# Patient Record
Sex: FEMALE | Race: OTHER | HISPANIC OR LATINO | Employment: UNEMPLOYED | URBAN - METROPOLITAN AREA
[De-identification: names, ages, dates, MRNs, and addresses within clinical notes are randomized per-mention and may not be internally consistent; named-entity substitution may affect disease eponyms.]

---

## 2021-03-17 LAB
CHOLEST SERPL-MSCNC: 187 MG/DL (ref 0–200)
HBA1C MFR BLD: 12.5 % (ref 4–6)
HDLC SERPL-MCNC: 40 MG/DL
LDLC SERPL CALC-MCNC: 122 MG/DL
MICROALB/CREAT RATIO: 14
TRIGLYCERIDE (LIPID PAN): 140
VLDLC SERPL-MCNC: 25 MG/DL

## 2021-04-22 ENCOUNTER — OFFICE VISIT (OUTPATIENT)
Dept: PRIMARY CARE CLINIC | Facility: CLINIC | Age: 56
End: 2021-04-22
Payer: MEDICAID

## 2021-04-22 VITALS
RESPIRATION RATE: 18 BRPM | HEART RATE: 81 BPM | HEIGHT: 63 IN | WEIGHT: 137.38 LBS | BODY MASS INDEX: 24.34 KG/M2 | SYSTOLIC BLOOD PRESSURE: 108 MMHG | DIASTOLIC BLOOD PRESSURE: 62 MMHG | OXYGEN SATURATION: 98 %

## 2021-04-22 DIAGNOSIS — E55.9 VITAMIN D DEFICIENCY: Primary | ICD-10-CM

## 2021-04-22 DIAGNOSIS — E53.8 VITAMIN B12 DEFICIENCY: ICD-10-CM

## 2021-04-22 DIAGNOSIS — Z12.11 COLON CANCER SCREENING: ICD-10-CM

## 2021-04-22 DIAGNOSIS — Z12.31 BREAST CANCER SCREENING BY MAMMOGRAM: ICD-10-CM

## 2021-04-22 DIAGNOSIS — Z00.00 ANNUAL PHYSICAL EXAM: ICD-10-CM

## 2021-04-22 DIAGNOSIS — E03.8 OTHER SPECIFIED HYPOTHYROIDISM: ICD-10-CM

## 2021-04-22 DIAGNOSIS — R35.0 URINE FREQUENCY: ICD-10-CM

## 2021-04-22 DIAGNOSIS — E11.9 TYPE 2 DIABETES MELLITUS WITHOUT COMPLICATION, WITHOUT LONG-TERM CURRENT USE OF INSULIN: Chronic | ICD-10-CM

## 2021-04-22 PROCEDURE — 99204 OFFICE O/P NEW MOD 45 MIN: CPT | Mod: S$GLB,,, | Performed by: NURSE PRACTITIONER

## 2021-04-22 PROCEDURE — 99204 PR OFFICE/OUTPT VISIT, NEW, LEVL IV, 45-59 MIN: ICD-10-PCS | Mod: S$GLB,,, | Performed by: NURSE PRACTITIONER

## 2021-04-22 RX ORDER — METFORMIN HYDROCHLORIDE 500 MG/1
500 TABLET ORAL
COMMUNITY
End: 2021-04-26

## 2021-04-23 LAB
25(OH)D3 SERPL-MCNC: 27 NG/ML (ref 30–100)
ALBUMIN SERPL-MCNC: 4.4 G/DL (ref 3.6–5.1)
ALBUMIN/GLOB SERPL: 1.7 (CALC) (ref 1–2.5)
ALP SERPL-CCNC: 108 U/L (ref 37–153)
ALT SERPL-CCNC: 10 U/L (ref 6–29)
AST SERPL-CCNC: 14 U/L (ref 10–35)
BASOPHILS # BLD AUTO: 33 CELLS/UL (ref 0–200)
BASOPHILS NFR BLD AUTO: 0.4 %
BILIRUB SERPL-MCNC: 0.4 MG/DL (ref 0.2–1.2)
BUN SERPL-MCNC: 12 MG/DL (ref 7–25)
BUN/CREAT SERPL: 26 (CALC) (ref 6–22)
CALCIUM SERPL-MCNC: 9.3 MG/DL (ref 8.6–10.4)
CHLORIDE SERPL-SCNC: 103 MMOL/L (ref 98–110)
CHOLEST SERPL-MCNC: 221 MG/DL
CHOLEST/HDLC SERPL: 5.1 (CALC)
CO2 SERPL-SCNC: 30 MMOL/L (ref 20–32)
CREAT SERPL-MCNC: 0.47 MG/DL (ref 0.5–1.05)
EOSINOPHIL # BLD AUTO: 90 CELLS/UL (ref 15–500)
EOSINOPHIL NFR BLD AUTO: 1.1 %
ERYTHROCYTE [DISTWIDTH] IN BLOOD BY AUTOMATED COUNT: 13.4 % (ref 11–15)
GLOBULIN SER CALC-MCNC: 2.6 G/DL (CALC) (ref 1.9–3.7)
GLUCOSE SERPL-MCNC: 271 MG/DL (ref 65–139)
HBA1C MFR BLD: 11.2 % OF TOTAL HGB
HCT VFR BLD AUTO: 39.5 % (ref 35–45)
HDLC SERPL-MCNC: 43 MG/DL
HGB BLD-MCNC: 13.2 G/DL (ref 11.7–15.5)
LDLC SERPL CALC-MCNC: 129 MG/DL (CALC)
LYMPHOCYTES # BLD AUTO: 2657 CELLS/UL (ref 850–3900)
LYMPHOCYTES NFR BLD AUTO: 32.4 %
MCH RBC QN AUTO: 28.3 PG (ref 27–33)
MCHC RBC AUTO-ENTMCNC: 33.4 G/DL (ref 32–36)
MCV RBC AUTO: 84.8 FL (ref 80–100)
MONOCYTES # BLD AUTO: 418 CELLS/UL (ref 200–950)
MONOCYTES NFR BLD AUTO: 5.1 %
NEUTROPHILS # BLD AUTO: 5002 CELLS/UL (ref 1500–7800)
NEUTROPHILS NFR BLD AUTO: 61 %
NONHDLC SERPL-MCNC: 178 MG/DL (CALC)
PLATELET # BLD AUTO: 207 THOUSAND/UL (ref 140–400)
PMV BLD REES-ECKER: 13.8 FL (ref 7.5–12.5)
POTASSIUM SERPL-SCNC: 3.8 MMOL/L (ref 3.5–5.3)
PROT SERPL-MCNC: 7 G/DL (ref 6.1–8.1)
RBC # BLD AUTO: 4.66 MILLION/UL (ref 3.8–5.1)
SODIUM SERPL-SCNC: 140 MMOL/L (ref 135–146)
T4 FREE SERPL-MCNC: 1.1 NG/DL (ref 0.8–1.8)
TRIGL SERPL-MCNC: 340 MG/DL
TSH SERPL-ACNC: 0.59 MIU/L
WBC # BLD AUTO: 8.2 THOUSAND/UL (ref 3.8–10.8)

## 2021-04-24 LAB
APPEARANCE UR: CLEAR
BACTERIA #/AREA URNS HPF: ABNORMAL /HPF
BACTERIA UR CULT: ABNORMAL
BILIRUB UR QL STRIP: NEGATIVE
COLOR UR: YELLOW
FOLATE SERPL-MCNC: 20.3 NG/ML
GLUCOSE UR QL STRIP: ABNORMAL
HGB UR QL STRIP: NEGATIVE
HYALINE CASTS #/AREA URNS LPF: ABNORMAL /LPF
KETONES UR QL STRIP: ABNORMAL
LEUKOCYTE ESTERASE UR QL STRIP: ABNORMAL
NITRITE UR QL STRIP: NEGATIVE
PH UR STRIP: 5.5 [PH] (ref 5–8)
PROT UR QL STRIP: NEGATIVE
RBC #/AREA URNS HPF: ABNORMAL /HPF
SP GR UR STRIP: 1.03 (ref 1–1.03)
SQUAMOUS #/AREA URNS HPF: ABNORMAL /HPF
T3FREE SERPL-MCNC: 3 PG/ML (ref 2.3–4.2)
VIT B12 SERPL-MCNC: 388 PG/ML (ref 200–1100)
WBC #/AREA URNS HPF: ABNORMAL /HPF

## 2021-04-26 DIAGNOSIS — E78.49 OTHER HYPERLIPIDEMIA: ICD-10-CM

## 2021-04-26 DIAGNOSIS — E11.9 TYPE 2 DIABETES MELLITUS WITHOUT COMPLICATION, WITHOUT LONG-TERM CURRENT USE OF INSULIN: ICD-10-CM

## 2021-04-26 DIAGNOSIS — N30.00 ACUTE CYSTITIS WITHOUT HEMATURIA: Primary | ICD-10-CM

## 2021-04-26 RX ORDER — ROSUVASTATIN CALCIUM 5 MG/1
5 TABLET, COATED ORAL DAILY
Qty: 90 TABLET | Refills: 3 | Status: SHIPPED | OUTPATIENT
Start: 2021-04-26 | End: 2022-06-16

## 2021-04-26 RX ORDER — METFORMIN HYDROCHLORIDE 500 MG/1
500 TABLET ORAL 2 TIMES DAILY WITH MEALS
Qty: 180 TABLET | Refills: 1 | Status: SHIPPED | OUTPATIENT
Start: 2021-04-26 | End: 2021-10-21 | Stop reason: ALTCHOICE

## 2021-04-26 RX ORDER — AMOXICILLIN 500 MG/1
500 TABLET, FILM COATED ORAL EVERY 12 HOURS
Qty: 14 TABLET | Refills: 0 | Status: SHIPPED | OUTPATIENT
Start: 2021-04-26 | End: 2021-05-03

## 2021-04-27 ENCOUNTER — TELEPHONE (OUTPATIENT)
Dept: PRIMARY CARE CLINIC | Facility: CLINIC | Age: 56
End: 2021-04-27

## 2021-05-05 ENCOUNTER — TELEPHONE (OUTPATIENT)
Dept: SURGERY | Facility: CLINIC | Age: 56
End: 2021-05-05

## 2021-05-05 DIAGNOSIS — Z12.11 COLON CANCER SCREENING: Primary | ICD-10-CM

## 2021-05-06 ENCOUNTER — PATIENT OUTREACH (OUTPATIENT)
Dept: ADMINISTRATIVE | Facility: HOSPITAL | Age: 56
End: 2021-05-06

## 2021-07-22 ENCOUNTER — OFFICE VISIT (OUTPATIENT)
Dept: PRIMARY CARE CLINIC | Facility: CLINIC | Age: 56
End: 2021-07-22
Payer: MEDICAID

## 2021-07-22 VITALS
OXYGEN SATURATION: 98 % | SYSTOLIC BLOOD PRESSURE: 112 MMHG | DIASTOLIC BLOOD PRESSURE: 67 MMHG | BODY MASS INDEX: 24.63 KG/M2 | RESPIRATION RATE: 18 BRPM | HEART RATE: 79 BPM | HEIGHT: 63 IN | WEIGHT: 139 LBS

## 2021-07-22 DIAGNOSIS — Z12.31 BREAST CANCER SCREENING BY MAMMOGRAM: ICD-10-CM

## 2021-07-22 DIAGNOSIS — E78.49 OTHER HYPERLIPIDEMIA: ICD-10-CM

## 2021-07-22 DIAGNOSIS — E11.9 TYPE 2 DIABETES MELLITUS WITHOUT COMPLICATION, WITHOUT LONG-TERM CURRENT USE OF INSULIN: Primary | ICD-10-CM

## 2021-07-22 PROBLEM — E78.00 PURE HYPERCHOLESTEROLEMIA: Status: ACTIVE | Noted: 2021-07-22

## 2021-07-22 PROCEDURE — 99214 OFFICE O/P EST MOD 30 MIN: CPT | Mod: S$GLB,,, | Performed by: NURSE PRACTITIONER

## 2021-07-22 PROCEDURE — 99214 PR OFFICE/OUTPT VISIT, EST, LEVL IV, 30-39 MIN: ICD-10-PCS | Mod: S$GLB,,, | Performed by: NURSE PRACTITIONER

## 2021-07-22 RX ORDER — PIOGLITAZONEHYDROCHLORIDE 15 MG/1
TABLET ORAL
COMMUNITY
Start: 2021-05-06 | End: 2021-07-22 | Stop reason: ALTCHOICE

## 2021-07-29 LAB
ALBUMIN SERPL-MCNC: 4.4 G/DL (ref 3.6–5.1)
ALBUMIN/GLOB SERPL: 1.6 (CALC) (ref 1–2.5)
ALP SERPL-CCNC: 94 U/L (ref 37–153)
ALT SERPL-CCNC: 17 U/L (ref 6–29)
AST SERPL-CCNC: 16 U/L (ref 10–35)
BILIRUB SERPL-MCNC: 0.6 MG/DL (ref 0.2–1.2)
BUN SERPL-MCNC: 12 MG/DL (ref 7–25)
BUN/CREAT SERPL: ABNORMAL (CALC) (ref 6–22)
CALCIUM SERPL-MCNC: 8.9 MG/DL (ref 8.6–10.4)
CHLORIDE SERPL-SCNC: 105 MMOL/L (ref 98–110)
CO2 SERPL-SCNC: 27 MMOL/L (ref 20–32)
CREAT SERPL-MCNC: 0.58 MG/DL (ref 0.5–1.05)
GLOBULIN SER CALC-MCNC: 2.7 G/DL (CALC) (ref 1.9–3.7)
GLUCOSE SERPL-MCNC: 231 MG/DL (ref 65–99)
HBA1C MFR BLD: 11.1 % OF TOTAL HGB
POTASSIUM SERPL-SCNC: 4.1 MMOL/L (ref 3.5–5.3)
PROT SERPL-MCNC: 7.1 G/DL (ref 6.1–8.1)
SODIUM SERPL-SCNC: 140 MMOL/L (ref 135–146)

## 2021-08-02 ENCOUNTER — PATIENT MESSAGE (OUTPATIENT)
Dept: ADMINISTRATIVE | Facility: HOSPITAL | Age: 56
End: 2021-08-02

## 2021-08-05 ENCOUNTER — TELEPHONE (OUTPATIENT)
Dept: PRIMARY CARE CLINIC | Facility: CLINIC | Age: 56
End: 2021-08-05

## 2021-09-03 ENCOUNTER — IMMUNIZATION (OUTPATIENT)
Dept: HEMATOLOGY/ONCOLOGY | Facility: CLINIC | Age: 56
End: 2021-09-03
Payer: MEDICAID

## 2021-09-03 DIAGNOSIS — Z23 NEED FOR VACCINATION: Primary | ICD-10-CM

## 2021-09-03 PROCEDURE — 0001A COVID-19, MRNA, LNP-S, PF, 30 MCG/0.3 ML DOSE VACCINE: ICD-10-PCS | Mod: CV19,S$GLB,, | Performed by: FAMILY MEDICINE

## 2021-09-03 PROCEDURE — 91300 COVID-19, MRNA, LNP-S, PF, 30 MCG/0.3 ML DOSE VACCINE: CPT | Mod: S$GLB,,, | Performed by: FAMILY MEDICINE

## 2021-09-03 PROCEDURE — 0001A COVID-19, MRNA, LNP-S, PF, 30 MCG/0.3 ML DOSE VACCINE: CPT | Mod: CV19,S$GLB,, | Performed by: FAMILY MEDICINE

## 2021-09-03 PROCEDURE — 91300 COVID-19, MRNA, LNP-S, PF, 30 MCG/0.3 ML DOSE VACCINE: ICD-10-PCS | Mod: S$GLB,,, | Performed by: FAMILY MEDICINE

## 2021-09-27 ENCOUNTER — IMMUNIZATION (OUTPATIENT)
Dept: HEMATOLOGY/ONCOLOGY | Facility: CLINIC | Age: 56
End: 2021-09-27
Payer: MEDICAID

## 2021-09-27 DIAGNOSIS — Z23 NEED FOR VACCINATION: Primary | ICD-10-CM

## 2021-09-27 PROCEDURE — 0002A COVID-19, MRNA, LNP-S, PF, 30 MCG/0.3 ML DOSE VACCINE: ICD-10-PCS | Mod: S$GLB,,, | Performed by: FAMILY MEDICINE

## 2021-09-27 PROCEDURE — 91300 COVID-19, MRNA, LNP-S, PF, 30 MCG/0.3 ML DOSE VACCINE: ICD-10-PCS | Mod: S$GLB,,, | Performed by: FAMILY MEDICINE

## 2021-09-27 PROCEDURE — 0002A COVID-19, MRNA, LNP-S, PF, 30 MCG/0.3 ML DOSE VACCINE: CPT | Mod: S$GLB,,, | Performed by: FAMILY MEDICINE

## 2021-09-27 PROCEDURE — 91300 COVID-19, MRNA, LNP-S, PF, 30 MCG/0.3 ML DOSE VACCINE: CPT | Mod: S$GLB,,, | Performed by: FAMILY MEDICINE

## 2021-10-21 ENCOUNTER — OFFICE VISIT (OUTPATIENT)
Dept: PRIMARY CARE CLINIC | Facility: CLINIC | Age: 56
End: 2021-10-21
Payer: MEDICAID

## 2021-10-21 VITALS
SYSTOLIC BLOOD PRESSURE: 111 MMHG | RESPIRATION RATE: 18 BRPM | HEIGHT: 63 IN | OXYGEN SATURATION: 99 % | DIASTOLIC BLOOD PRESSURE: 74 MMHG | HEART RATE: 72 BPM | WEIGHT: 143 LBS | BODY MASS INDEX: 25.34 KG/M2

## 2021-10-21 DIAGNOSIS — E78.00 PURE HYPERCHOLESTEROLEMIA: Primary | ICD-10-CM

## 2021-10-21 DIAGNOSIS — E11.9 TYPE 2 DIABETES MELLITUS WITHOUT COMPLICATION, WITHOUT LONG-TERM CURRENT USE OF INSULIN: ICD-10-CM

## 2021-10-21 DIAGNOSIS — R80.9 URINE TEST POSITIVE FOR MICROALBUMINURIA: ICD-10-CM

## 2021-10-21 PROCEDURE — 99214 PR OFFICE/OUTPT VISIT, EST, LEVL IV, 30-39 MIN: ICD-10-PCS | Mod: S$GLB,,, | Performed by: NURSE PRACTITIONER

## 2021-10-21 PROCEDURE — 99214 OFFICE O/P EST MOD 30 MIN: CPT | Mod: S$GLB,,, | Performed by: NURSE PRACTITIONER

## 2021-11-20 LAB
ALBUMIN SERPL-MCNC: 4.1 G/DL (ref 3.6–5.1)
ALBUMIN/GLOB SERPL: 1.5 (CALC) (ref 1–2.5)
ALP SERPL-CCNC: 101 U/L (ref 37–153)
ALT SERPL-CCNC: 12 U/L (ref 6–29)
AST SERPL-CCNC: 13 U/L (ref 10–35)
BILIRUB SERPL-MCNC: 0.6 MG/DL (ref 0.2–1.2)
BUN SERPL-MCNC: 12 MG/DL (ref 7–25)
BUN/CREAT SERPL: 24 (CALC) (ref 6–22)
CALCIUM SERPL-MCNC: 8.8 MG/DL (ref 8.6–10.4)
CHLORIDE SERPL-SCNC: 103 MMOL/L (ref 98–110)
CHOLEST SERPL-MCNC: 156 MG/DL
CHOLEST/HDLC SERPL: 3.4 (CALC)
CO2 SERPL-SCNC: 27 MMOL/L (ref 20–32)
CREAT SERPL-MCNC: 0.49 MG/DL (ref 0.5–1.05)
GLOBULIN SER CALC-MCNC: 2.7 G/DL (CALC) (ref 1.9–3.7)
GLUCOSE SERPL-MCNC: 260 MG/DL (ref 65–99)
HBA1C MFR BLD: 11.6 % OF TOTAL HGB
HDLC SERPL-MCNC: 46 MG/DL
LDLC SERPL CALC-MCNC: 88 MG/DL (CALC)
NONHDLC SERPL-MCNC: 110 MG/DL (CALC)
POTASSIUM SERPL-SCNC: 4.2 MMOL/L (ref 3.5–5.3)
PROT SERPL-MCNC: 6.8 G/DL (ref 6.1–8.1)
SODIUM SERPL-SCNC: 137 MMOL/L (ref 135–146)
TRIGL SERPL-MCNC: 122 MG/DL

## 2021-11-30 ENCOUNTER — PATIENT OUTREACH (OUTPATIENT)
Dept: ADMINISTRATIVE | Facility: HOSPITAL | Age: 56
End: 2021-11-30
Payer: MEDICAID

## 2021-12-02 DIAGNOSIS — E11.9 TYPE 2 DIABETES MELLITUS WITHOUT COMPLICATION, WITHOUT LONG-TERM CURRENT USE OF INSULIN: Primary | ICD-10-CM

## 2021-12-02 RX ORDER — GLIMEPIRIDE 2 MG/1
2 TABLET ORAL
Qty: 90 TABLET | Refills: 1 | Status: SHIPPED | OUTPATIENT
Start: 2021-12-02 | End: 2022-10-25 | Stop reason: ALTCHOICE

## 2021-12-03 ENCOUNTER — TELEPHONE (OUTPATIENT)
Dept: PRIMARY CARE CLINIC | Facility: CLINIC | Age: 56
End: 2021-12-03
Payer: MEDICAID

## 2022-01-28 ENCOUNTER — CLINICAL SUPPORT (OUTPATIENT)
Dept: PRIMARY CARE CLINIC | Facility: CLINIC | Age: 57
End: 2022-01-28
Payer: MEDICAID

## 2022-01-28 DIAGNOSIS — R05.9 COUGH: Primary | ICD-10-CM

## 2022-01-28 DIAGNOSIS — R53.83 FATIGUE, UNSPECIFIED TYPE: ICD-10-CM

## 2022-02-16 DIAGNOSIS — E11.9 TYPE 2 DIABETES MELLITUS WITHOUT COMPLICATION, WITHOUT LONG-TERM CURRENT USE OF INSULIN: ICD-10-CM

## 2022-02-16 RX ORDER — SITAGLIPTIN 25 MG/1
TABLET, FILM COATED ORAL
Qty: 30 TABLET | Refills: 3 | OUTPATIENT
Start: 2022-02-16

## 2022-03-26 DIAGNOSIS — E11.9 TYPE 2 DIABETES MELLITUS WITHOUT COMPLICATION, WITHOUT LONG-TERM CURRENT USE OF INSULIN: ICD-10-CM

## 2022-03-27 RX ORDER — SITAGLIPTIN 25 MG/1
TABLET, FILM COATED ORAL
Qty: 30 TABLET | Refills: 3 | OUTPATIENT
Start: 2022-03-27

## 2022-05-17 ENCOUNTER — PATIENT OUTREACH (OUTPATIENT)
Dept: ADMINISTRATIVE | Facility: HOSPITAL | Age: 57
End: 2022-05-17
Payer: MEDICAID

## 2022-05-17 NOTE — PROGRESS NOTES
Working gap report for [facility]. No eye exam or other results found. According to recent medication documentation and Care Everywhere pt has a new PCP. EMR updated.

## 2022-06-16 DIAGNOSIS — E78.49 OTHER HYPERLIPIDEMIA: ICD-10-CM

## 2022-06-16 RX ORDER — ROSUVASTATIN CALCIUM 5 MG/1
TABLET, COATED ORAL
Qty: 90 TABLET | Refills: 3 | Status: SHIPPED | OUTPATIENT
Start: 2022-06-16 | End: 2022-10-25 | Stop reason: SDUPTHER

## 2022-06-19 DIAGNOSIS — E11.9 TYPE 2 DIABETES MELLITUS WITHOUT COMPLICATION, WITHOUT LONG-TERM CURRENT USE OF INSULIN: ICD-10-CM

## 2022-06-22 RX ORDER — SITAGLIPTIN 50 MG/1
TABLET, FILM COATED ORAL
Qty: 90 TABLET | Refills: 1 | Status: SHIPPED | OUTPATIENT
Start: 2022-06-22 | End: 2022-10-25 | Stop reason: SDUPTHER

## 2022-07-22 ENCOUNTER — PATIENT MESSAGE (OUTPATIENT)
Dept: ADMINISTRATIVE | Facility: HOSPITAL | Age: 57
End: 2022-07-22
Payer: MEDICAID

## 2022-08-03 ENCOUNTER — PATIENT MESSAGE (OUTPATIENT)
Dept: ADMINISTRATIVE | Facility: HOSPITAL | Age: 57
End: 2022-08-03
Payer: MEDICAID

## 2022-08-04 ENCOUNTER — PATIENT OUTREACH (OUTPATIENT)
Dept: ADMINISTRATIVE | Facility: HOSPITAL | Age: 57
End: 2022-08-04
Payer: MEDICAID

## 2022-10-04 RX ORDER — VALACYCLOVIR HYDROCHLORIDE 1 G/1
1 TABLET, FILM COATED ORAL
COMMUNITY
Start: 2022-09-23 | End: 2023-01-26

## 2022-10-04 RX ORDER — VALACYCLOVIR HYDROCHLORIDE 1 G/1
1 TABLET, FILM COATED ORAL DAILY
OUTPATIENT
Start: 2022-10-04

## 2022-10-25 ENCOUNTER — OFFICE VISIT (OUTPATIENT)
Dept: PRIMARY CARE CLINIC | Facility: CLINIC | Age: 57
End: 2022-10-25
Payer: MEDICAID

## 2022-10-25 ENCOUNTER — PATIENT MESSAGE (OUTPATIENT)
Dept: ADMINISTRATIVE | Facility: HOSPITAL | Age: 57
End: 2022-10-25
Payer: MEDICAID

## 2022-10-25 VITALS
HEART RATE: 99 BPM | SYSTOLIC BLOOD PRESSURE: 120 MMHG | WEIGHT: 135 LBS | RESPIRATION RATE: 18 BRPM | DIASTOLIC BLOOD PRESSURE: 73 MMHG | HEIGHT: 63 IN | BODY MASS INDEX: 23.92 KG/M2 | OXYGEN SATURATION: 99 %

## 2022-10-25 DIAGNOSIS — E78.49 OTHER HYPERLIPIDEMIA: ICD-10-CM

## 2022-10-25 DIAGNOSIS — Z11.59 NEED FOR HEPATITIS C SCREENING TEST: ICD-10-CM

## 2022-10-25 DIAGNOSIS — Z13.29 THYROID DISORDER SCREEN: ICD-10-CM

## 2022-10-25 DIAGNOSIS — J06.9 ACUTE UPPER RESPIRATORY INFECTION: ICD-10-CM

## 2022-10-25 DIAGNOSIS — Z00.00 ANNUAL PHYSICAL EXAM: ICD-10-CM

## 2022-10-25 DIAGNOSIS — E11.9 TYPE 2 DIABETES MELLITUS WITHOUT COMPLICATION, WITHOUT LONG-TERM CURRENT USE OF INSULIN: ICD-10-CM

## 2022-10-25 DIAGNOSIS — E78.00 PURE HYPERCHOLESTEROLEMIA: Primary | ICD-10-CM

## 2022-10-25 DIAGNOSIS — A60.00 GENITAL HERPES SIMPLEX, UNSPECIFIED SITE: ICD-10-CM

## 2022-10-25 DIAGNOSIS — Z11.4 SCREENING FOR HIV (HUMAN IMMUNODEFICIENCY VIRUS): ICD-10-CM

## 2022-10-25 DIAGNOSIS — Z20.2 POSSIBLE EXPOSURE TO STD: ICD-10-CM

## 2022-10-25 DIAGNOSIS — R35.0 URINE FREQUENCY: ICD-10-CM

## 2022-10-25 PROCEDURE — 3074F SYST BP LT 130 MM HG: CPT | Mod: CPTII,S$GLB,, | Performed by: NURSE PRACTITIONER

## 2022-10-25 PROCEDURE — 1159F MED LIST DOCD IN RCRD: CPT | Mod: CPTII,S$GLB,, | Performed by: NURSE PRACTITIONER

## 2022-10-25 PROCEDURE — 99396 PR PREVENTIVE VISIT,EST,40-64: ICD-10-PCS | Mod: S$GLB,,, | Performed by: NURSE PRACTITIONER

## 2022-10-25 PROCEDURE — 1160F PR REVIEW ALL MEDS BY PRESCRIBER/CLIN PHARMACIST DOCUMENTED: ICD-10-PCS | Mod: CPTII,S$GLB,, | Performed by: NURSE PRACTITIONER

## 2022-10-25 PROCEDURE — 3078F DIAST BP <80 MM HG: CPT | Mod: CPTII,S$GLB,, | Performed by: NURSE PRACTITIONER

## 2022-10-25 PROCEDURE — 1159F PR MEDICATION LIST DOCUMENTED IN MEDICAL RECORD: ICD-10-PCS | Mod: CPTII,S$GLB,, | Performed by: NURSE PRACTITIONER

## 2022-10-25 PROCEDURE — 3078F PR MOST RECENT DIASTOLIC BLOOD PRESSURE < 80 MM HG: ICD-10-PCS | Mod: CPTII,S$GLB,, | Performed by: NURSE PRACTITIONER

## 2022-10-25 PROCEDURE — 3046F PR MOST RECENT HEMOGLOBIN A1C LEVEL > 9.0%: ICD-10-PCS | Mod: CPTII,S$GLB,, | Performed by: NURSE PRACTITIONER

## 2022-10-25 PROCEDURE — 1160F RVW MEDS BY RX/DR IN RCRD: CPT | Mod: CPTII,S$GLB,, | Performed by: NURSE PRACTITIONER

## 2022-10-25 PROCEDURE — 3074F PR MOST RECENT SYSTOLIC BLOOD PRESSURE < 130 MM HG: ICD-10-PCS | Mod: CPTII,S$GLB,, | Performed by: NURSE PRACTITIONER

## 2022-10-25 PROCEDURE — 99396 PREV VISIT EST AGE 40-64: CPT | Mod: S$GLB,,, | Performed by: NURSE PRACTITIONER

## 2022-10-25 PROCEDURE — 3046F HEMOGLOBIN A1C LEVEL >9.0%: CPT | Mod: CPTII,S$GLB,, | Performed by: NURSE PRACTITIONER

## 2022-10-25 RX ORDER — AZITHROMYCIN 250 MG/1
TABLET, FILM COATED ORAL
Qty: 6 TABLET | Refills: 0 | Status: SHIPPED | OUTPATIENT
Start: 2022-10-25 | End: 2022-10-30

## 2022-10-25 RX ORDER — ROSUVASTATIN CALCIUM 5 MG/1
5 TABLET, COATED ORAL DAILY
Qty: 90 TABLET | Refills: 3 | Status: SHIPPED | OUTPATIENT
Start: 2022-10-25 | End: 2023-05-04 | Stop reason: SDUPTHER

## 2022-10-25 NOTE — PATIENT INSTRUCTIONS
"RTC in 3 months for F/U or sooner if needed.    Keep appts with specialists as scheduled.    Instructed patient to report to nearest ER or call 911 if begins to have difficulty breathing, turning blue, chest pain, B/P < 80/60 or >170/100, palpitations, syncope, extreme weakness, or severe H/A. Patient verbalized understanding.      Patient Education       Diabetes and Diet   The Basics   Written by the doctors and editors at [facility]   Why is diet important in diabetes? -- Diet is important because it is part of diabetes treatment. Many people need to change what they eat and how much they eat to help treat their diabetes. It is important for people to treat their diabetes so that they:  Keep their blood sugar at or near a normal level  Prevent long-term problems, such as heart or kidney problems, that can happen in people with diabetes  Changing your diet can also help treat obesity, high blood pressure, and high cholesterol. These conditions can affect people with diabetes and can lead to future problems, such as heart attacks or strokes.  Who will work with me to change my diet? -- Your doctor or nurse will work with you to make a food plan to change your diet. They might also recommend that you work with a "dietitian." A dietitian is an expert on food and eating.  Do I need to eat at the same times every day? -- When and how often you should eat depends, in part, on the diabetes medicines you take. For example:  People who take about the same amount of insulin at the same time each day (called a "fixed regimen") should eat meals at the same times. This is also true for people who take pills that increase insulin levels, such as sulfonylureas. Eating meals at the same time every day helps prevent low blood sugar.  People who adjust the dose and timing of their insulin each day (called a "flexible regimen") do not always have to eat meals at the same time. That's because they can time their insulin dose for before " "they plan to eat, and also adjust the dose for how much they plan to eat.  People who take medicines that don't usually cause low blood sugar, such as metformin, don't have to eat meals at the same time every day.  What do I need to think about when planning what to eat? -- Our bodies break down the food we eat into small pieces called carbohydrates, proteins, and fats.  When planning what to eat, people with diabetes need to think about:  Carbohydrates (or "carbs") - Carbohydrates, which are sugars that our bodies use for energy, can raise a person's blood sugar level. Your doctor, nurse, or dietitian will tell you how many carbohydrates you should eat at each meal or snack. Foods that have carbohydrates include:  Bread, pasta, and rice  Vegetables and fruits  Dairy foods  Foods and drinks with added sugar  It is best to get your carbohydrates from fruits, vegetables, whole grains, and low-fat milk. It is best to avoid drinks with added sugar, like soda, juices, and sports drinks.   Protein - Your doctor, nurse, or dietitian will tell you how much protein you should eat each day. It is best to eat lean meats, fish, eggs, beans, peas, soy products, nuts, and seeds.  Fats - The type of fat you eat is more important than the amount of fat. "Saturated" and "trans" fats can increase your risk for heart problems, like a heart attack.  Foods that have saturated fats include meat, butter, cheese, and ice cream.  Foods that have trans fats include processed food with "partially hydrogenated oils" on the ingredient list. This may include fried foods, store bought cookies, muffins, pies, and cakes.  "Monounsaturated" and "polyunsaturated" fats are better for you. Foods with these types of fat include fish, avocado, olive oil, and nuts.  Calories - People need to eat a certain amount of calories each day to keep their weight the same. People who are overweight and want to lose weight need to eat fewer calories each day.  Fiber " - Eating foods with a lot of fiber can help control a person's blood sugar level. Foods that have a lot of fiber include apples, green beans, peas, beans, lentils, nuts, oatmeal, and whole grains.  Salt - People who have high blood pressure should not eat foods that contain a lot of salt (also called sodium). People with high blood pressure should also eat healthy foods, such as fruits, vegetables, and low-fat dairy foods.  Alcohol - Having more than 1 drink (for women) or 2 drinks (for men) a day can raise blood sugar levels. Also, drinks that have fruit juice or soda in them can raise blood sugar levels.  What can I do if I need to lose weight? -- If you need to lose weight, you can:  Exercise - Try to get at least 30 minutes of physical activity a day, most days of the week. Even gentle exercise, like walking, is good for your health. Some people with diabetes need to change their medicine dose before they exercise. They might also need to check their blood sugar levels before and after exercising.  Eat fewer calories - Your doctor, nurse, or dietitian can tell you how many calories you should eat each day in order to lose weight.  If you are worried about your weight, size, or shape, talk with your doctor, nurse, or dietitian. They can help you make changes to improve your health.  Can I eat the same foods as my family? -- Yes. You do not need to eat special foods if you have diabetes. You and your family can eat the same foods. Changing your diet is mostly about eating healthy foods and not eating too much.  What are the other parts of diabetes treatment? -- Besides changing your diet, the other parts of diabetes treatment are:  Exercise  Medicines  Some people with diabetes need to learn how to match their diet and exercise with their medicine dose. For example, people who use insulin might need to choose the dose of insulin they give themselves. To choose their dose, they need to think about:  What they plan  to eat at the next meal  How much exercise they plan to do  What their blood sugar level is  If the diet and exercise do not match the medicine dose, a person's blood sugar level can get too low or too high. Blood sugar levels that are too low or too high can cause problems.  All topics are updated as new evidence becomes available and our peer review process is complete.  This topic retrieved from [employer] on: Sep 21, 2021.  Topic 96188 Version 7.0  Release: 29.4.2 - C29.263  © 2021 UpToDate, Inc. and/or its affiliates. All rights reserved.  Consumer Information Use and Disclaimer   This information is not specific medical advice and does not replace information you receive from your health care provider. This is only a brief summary of general information. It does NOT include all information about conditions, illnesses, injuries, tests, procedures, treatments, therapies, discharge instructions or life-style choices that may apply to you. You must talk with your health care provider for complete information about your health and treatment options. This information should not be used to decide whether or not to accept your health care provider's advice, instructions or recommendations. Only your health care provider has the knowledge and training to provide advice that is right for you. The use of this information is governed by the [employer] User License Agreement, available at https://www.[employer]/en/solutions/Ratio/about/hank.The use of [employer] content is governed by the [employer] Terms of Use. ©2021 Up[employer]Date, Inc. All rights reserved.  Copyright   © 2021 UpToDate, Inc. and/or its affiliates. All rights reserved.    Patient Education       STD Prevention   About this topic   Sexually-transmitted diseases or STDs are infections you catch during sexual contact. This includes vaginal, oral, and anal sex. You may also get it by coming in contact with skin, genitals, mouth, rectum, or body fluids of an  infected person. A person with an STD may not have any signs or know they have it. STDs can be very serious and have long-term health effects. STDs can cause cancer, blindness, or not being able to have children.  Bacteria, viruses, or parasites can cause STDs. Bacterial STDs are treated with antibiotics. Only the signs of viral STDs are treated. Common STDs include:  Chlamydia   Gonorrhea  Syphilis  Human immunodeficiency virus (HIV)  Herpes simplex  Human papillomavirus (HPV)  Hepatitis B and C  Trichomonas  STDs may cause signs like:  Pain when passing urine  Sores or genital warts  Unusual discharge from penis or vagina  Itching on your inner thighs, anus, or genitals  Abnormal menstrual bleeding  Pain or bleeding during sex  Swelling of testicles  Fever  Muscle or joint pain  These signs may come and go. It is important to see your doctor even if you think the signs are gone.  General   Learn about your health, your body, sex, love, and relationships. These are all important parts of sexual health.  Learn about STDs. Find reliable information about STDs.  Know the right names for both male and female body parts.  Find information about the kinds of infections you could get.  Know how STDs spread as well as the signs and treatment.     What will the results be?   You may be able to protect yourself from getting STDs.  You may be able to prevent long-term effects on your health.  If you are pregnant, you may be able to prevent giving your unborn baby a STD.  Will there be any other care needed?   Ask your doctor if there are shots or pills you can take to prevent a STD.  Know your STD status. Get tested and have your partner tested.  What can be done to prevent this health problem?   The only sure way to keep from getting or passing on a sexually-transmitted infection is to not have sexual contact with anyone.  Even if you do not have any signs of illness, you may spread an STD.  Having an STD can increase your  chances of catching HIV, the virus that causes AIDS.  If you have any type of sexual contact, use latex condoms each time to reduce the spread of infection. Use a condom with each partner every time, from the start of sex to the end.  Avoid contact with any sex partner known to have an infection or who has signs of an infection like genital sores or warts.  Avoid multiple sex partners. A long-term monogamous relationship with a partner who has tested negative and is known to have no infection is the safest partner.  If you are pregnant, get tested and get prompt treatment for an STD. This will help avoid passing it to your baby.  Avoid using drugs or too much alcohol. Either of these can lead to risky behavior like unprotected sex.  Talk to your partner about STDs before you have sex. Talk about having safe sex and if your relationship is monogamous.  Wash your hands and genitals with soap and water after sex.  See your doctor for regular exams and check-ups for STDs.  Talk to your doctor about available vaccines for prevention of certain STDs.  Where can I learn more?   American Academy of Family Physicians  http://familydoctor.org/familydoctor/en/diseases-conditions/sexually-transmitted-infections/prevention.printerview.all.html   Centers for Disease Control  http://www.cdc.gov/std/prevention/default.htm   Last Reviewed Date   2021-03-31  Consumer Information Use and Disclaimer   This information is not specific medical advice and does not replace information you receive from your health care provider. This is only a brief summary of general information. It does NOT include all information about conditions, illnesses, injuries, tests, procedures, treatments, therapies, discharge instructions or life-style choices that may apply to you. You must talk with your health care provider for complete information about your health and treatment options. This information should not be used to decide whether or not to accept  "your health care providers advice, instructions or recommendations. Only your health care provider has the knowledge and training to provide advice that is right for you.  Copyright   Copyright © 2021 UpToDate, Inc. and its affiliates and/or licensors. All rights reserved.      Patient Education       Low Cholesterol, Saturated Fat, and Trans Fat Diet   About this topic   Cholesterol, saturated fat, and trans fat are in many foods. These may raise your blood cholesterol levels. If your cholesterol is too high, this can cause health problems in your heart, liver, kidneys, and even your eyes. The key to lowering your risk of heart problems is to lower your bad fat intake.  Saturated fats and trans fats are the bad fats. These fats clog your arteries and raise your bad cholesterol. Saturated fats and trans fats are solid fats at room temperature. Saturated fats are animal fats. Trans fats are manmade fats. They add flavor to a lot of packaged foods. Staying away from saturated and trans fats will help your heart.  When you do eat foods with fat, make sure they have the good fats. Monounsaturated and polyunsaturated fats are good fats. These fats help raise your good cholesterol and protect your heart.  General   How to Lower Fat and Cholesterol in Your Diet   Read the labels of the foods you buy from the market to find out how much fat is present. Under 5% of total fat on a label means it is "low fat". Over 20% of total fat on a label means it is high fat.  Eat high fiber foods, like soluble fiber. This type of fiber helps lower cholesterol in the body. Choose oatmeal, fruits (like apples), beans, and nuts to get the most soluble fiber.  Eat foods high in omega-3 fatty acids like hernan seeds, walnuts, salmon, tuna, trout, herring, flaxseed, and soybeans. These foods help keep the heart healthy.  Limit your bad fat and oil intake.  Stay away from butter, stick margarine, shortening, lard, and palm and coconut oil. " Pick plant-based spreads instead.  Limit mayonnaise, salad dressings, gravies, and sauces, unless it is made from low-fat ingredients.  Limit chocolate.  Do not eat high-fat processed foods like hot dogs, kelley, sausage, ham and other luncheon meats high in fat, and some frozen foods. Pick fish, chicken, turkey, and lean meats instead.  Eat more dried beans, lentils, and tofu to get your protein.  Do not eat organ meats, like liver.  Choose nonfat or low-fat milk, yogurt, and cheese.  Use light or fat-free cream cheese and sour cream.  Eat lots of fruits and vegetables.  Pick whole grain breads, cereals, pastas, and rice.  Do not eat snacks that are high in fats like granola, cookies, pies, pastries, doughnuts, and croissants.  Stay away from deep fried foods.  Help When Cooking   Remove the fat portion of meats and the skin from poultry before cooking.  Bake, broil, grill, poach, or roast poultry, fish, and lean meats.  Drain and throw away the fat that drains out of meat as you cook it.  Try to add little or no fat to foods.  Use olive or canola oil for cooking or baking.  Steam your vegetables.  Use herbs or no-oil marinades to flavor foods.         Who should use this diet?   This diet is for people who are at high risk of getting health problems like heart disease, high blood pressure, diabetes, and others. This diet is also good for all people to follow to keep your heart healthy.  What foods are good to eat?   Foods with good fats are:  Canola, peanut, and olive oil  Safflower, soybean, and corn oil  Walnuts, almonds, cashews, and peanuts  Pumpkin and sunflower seeds  Hollis and tuna  Tofu  Soymilk  Avocado  What foods should be limited or avoided?   Stay away from these types of foods that have saturated fats:  Whole fat dairy products like cheese, ice cream, whole milk, and cream  Palm and coconut oils  High-fat meats like beef, lamb, poultry with the skin, kelley, and sausage  Butter and lard  Stay away  from these types of foods that may have trans fat:  Cookies, cakes, candy, doughnuts, baked goods, muffins, pizza dough, and pie crusts that are packaged  Fried foods  Frozen dinners  Chips and crackers  Microwave popcorn  Stick margarine and vegetable shortenings  Helpful tips   To help stay away from saturated fat:  Pick lean cuts of meat  Take the skin off chicken and turkey or pick skinless  Pick low-fat cheese, milk, and ice cream  Use liquid oils when cooking and baking, such as olive oil and canola oil  To help stay away from trans fat:  Look at your labels. Choose foods with 0% trans fat. Read the ingredient list. Avoid foods with partially hydrogenated oil in the ingredient list. This means there is trans fat in the product.  Where can I learn more?   American Heart Association   http://www.heart.org/HEARTORG/Conditions/Cholesterol/PreventionTreatmentofHighCholesterol/Know-Your-Fats_[facility]_305628_Article.jsp   Last Reviewed Date   2021-10-05  Consumer Information Use and Disclaimer   This information is not specific medical advice and does not replace information you receive from your health care provider. This is only a brief summary of general information. It does NOT include all information about conditions, illnesses, injuries, tests, procedures, treatments, therapies, discharge instructions or life-style choices that may apply to you. You must talk with your health care provider for complete information about your health and treatment options. This information should not be used to decide whether or not to accept your health care providers advice, instructions or recommendations. Only your health care provider has the knowledge and training to provide advice that is right for you.   Copyright   Copyright © 2021 Up[employer]Date, Inc. and its affiliates and/or licensors. All rights reserved.

## 2022-10-25 NOTE — PROGRESS NOTES
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 is to not have sexual contact with anyone.  Even if you do not have any signs of illness, you may spread an STD.  Having an STD can increase your chances of catching HIV, the virus that causes AIDS.  If you have any type of sexual contact, use latex condoms each time to reduce the spread of infection. Use a condom with each partner every time, from the start of sex to the end.  Avoid contact with any sex partner known to have an infection or who has signs of an infection like genital sores or warts.  Avoid multiple sex partners. A long-term monogamous relationship with a partner who has tested negative and is known to have no infection is the safest partner.  If you are pregnant, get tested and get prompt treatment for an STD. This will help avoid passing it to your baby.  Avoid using drugs or too much alcohol. Either of these can lead to risky behavior like unprotected sex.  Talk to your partner about STDs before you have sex. Talk about having safe sex and if your relationship is monogamous.  Wash your hands and genitals with soap and water after sex.  See your doctor for regular exams and check-ups for STDs.  Talk to your doctor about available vaccines for prevention of certain STDs.  Where can I learn more?   American Academy of Family  Physicians  http://familydoctor.org/familydoctor/en/diseases-conditions/sexually-transmitted-infections/prevention.printerview.all.html   Centers for Disease Control  http://www.cdc.gov/std/prevention/default.htm   Last Reviewed Date   2021-03-31  Consumer Information Use and Disclaimer   This information is not specific medical advice and does not replace information you receive from your health care provider. This is only a brief summary of general information. It does NOT include all information about conditions, illnesses, injuries, tests, procedures, treatments, therapies, discharge instructions or life-style choices that may apply to you. You must talk with your health care provider for complete information about your health and treatment options. This information should not be used to decide whether or not to accept your health care providers advice, instructions or recommendations. Only your health care provider has the knowledge and training to provide advice that is right for you.  Copyright   Copyright © 2021 UpTo[employer], Inc. and its affiliates and/or licensors. All rights reserved.      Patient Education       Low Cholesterol, Saturated Fat, and Trans Fat Diet   About this topic   Cholesterol, saturated fat, and trans fat are in many foods. These may raise your blood cholesterol levels. If your cholesterol is too high, this can cause health problems in your heart, liver, kidneys, and even your eyes. The key to lowering your risk of heart problems is to lower your bad fat intake.  Saturated fats and trans fats are the bad fats. These fats clog your arteries and raise your bad cholesterol. Saturated fats and trans fats are solid fats at room temperature. Saturated fats are animal fats. Trans fats are manmade fats. They add flavor to a lot of packaged foods. Staying away from saturated and trans fats will help your heart.  When you do eat foods with fat, make sure they have the good fats. Monounsaturated and  "polyunsaturated fats are good fats. These fats help raise your good cholesterol and protect your heart.  General   How to Lower Fat and Cholesterol in Your Diet   Read the labels of the foods you buy from the market to find out how much fat is present. Under 5% of total fat on a label means it is "low fat". Over 20% of total fat on a label means it is high fat.  Eat high fiber foods, like soluble fiber. This type of fiber helps lower cholesterol in the body. Choose oatmeal, fruits (like apples), beans, and nuts to get the most soluble fiber.  Eat foods high in omega-3 fatty acids like hernan seeds, walnuts, salmon, tuna, trout, herring, flaxseed, and soybeans. These foods help keep the heart healthy.  Limit your bad fat and oil intake.  Stay away from butter, stick margarine, shortening, lard, and palm and coconut oil. Pick plant-based spreads instead.  Limit mayonnaise, salad dressings, gravies, and sauces, unless it is made from low-fat ingredients.  Limit chocolate.  Do not eat high-fat processed foods like hot dogs, kelley, sausage, ham and other luncheon meats high in fat, and some frozen foods. Pick fish, chicken, turkey, and lean meats instead.  Eat more dried beans, lentils, and tofu to get your protein.  Do not eat organ meats, like liver.  Choose nonfat or low-fat milk, yogurt, and cheese.  Use light or fat-free cream cheese and sour cream.  Eat lots of fruits and vegetables.  Pick whole grain breads, cereals, pastas, and rice.  Do not eat snacks that are high in fats like granola, cookies, pies, pastries, doughnuts, and croissants.  Stay away from deep fried foods.  Help When Cooking   Remove the fat portion of meats and the skin from poultry before cooking.  Bake, broil, grill, poach, or roast poultry, fish, and lean meats.  Drain and throw away the fat that drains out of meat as you cook it.  Try to add little or no fat to foods.  Use olive or canola oil for cooking or baking.  Steam your " vegetables.  Use herbs or no-oil marinades to flavor foods.         Who should use this diet?   This diet is for people who are at high risk of getting health problems like heart disease, high blood pressure, diabetes, and others. This diet is also good for all people to follow to keep your heart healthy.  What foods are good to eat?   Foods with good fats are:  Canola, peanut, and olive oil  Safflower, soybean, and corn oil  Walnuts, almonds, cashews, and peanuts  Pumpkin and sunflower seeds  [place] and tuna  Tofu  Soymilk  Avocado  What foods should be limited or avoided?   Stay away from these types of foods that have saturated fats:  Whole fat dairy products like cheese, ice cream, whole milk, and cream  Palm and coconut oils  High-fat meats like beef, lamb, poultry with the skin, kelley, and sausage  Butter and lard  Stay away from these types of foods that may have trans fat:  Cookies, cakes, candy, doughnuts, baked goods, muffins, pizza dough, and pie crusts that are packaged  Fried foods  Frozen dinners  Chips and crackers  Microwave popcorn  Stick margarine and vegetable shortenings  Helpful tips   To help stay away from saturated fat:  Pick lean cuts of meat  Take the skin off chicken and turkey or pick skinless  Pick low-fat cheese, milk, and ice cream  Use liquid oils when cooking and baking, such as olive oil and canola oil  To help stay away from trans fat:  Look at your labels. Choose foods with 0% trans fat. Read the ingredient list. Avoid foods with partially hydrogenated oil in the ingredient list. This means there is trans fat in the product.  Where can I learn more?   American Heart Association   http://www.heart.org/HEARTORG/Conditions/Cholesterol/PreventionTreatmentofHighCholesterol/Know-Your-Fats_[facility]_305628_Article.jsp   Last Reviewed Date   2021-10-05  Consumer Information Use and Disclaimer   This information is not specific medical advice and does not replace information you receive from  your health care provider. This is only a brief summary of general information. It does NOT include all information about conditions, illnesses, injuries, tests, procedures, treatments, therapies, discharge instructions or life-style choices that may apply to you. You must talk with your health care provider for complete information about your health and treatment options. This information should not be used to decide whether or not to accept your health care providers advice, instructions or recommendations. Only your health care provider has the knowledge and training to provide advice that is right for you.   Copyright   Copyright © 2021 UpToDate, Inc. and its affiliates and/or licensors. All rights reserved.          Risks, benefits, and alternatives discussed with patient, Patient verbalized understanding of discussed plan of care. Asked patient if any further questions, answered no.    Future Appointments   Date Time Provider Department Center   1/26/2023 11:20 AM [facility]

## 2022-10-26 LAB
APPEARANCE UR: CLEAR
BACTERIA #/AREA URNS HPF: ABNORMAL /HPF
BACTERIA UR CULT: ABNORMAL
BILIRUB UR QL STRIP: NEGATIVE
C TRACH RRNA SPEC QL NAA+PROBE: NOT DETECTED
COLOR UR: ABNORMAL
COMMENT: NORMAL
GLUCOSE UR QL STRIP: ABNORMAL
HGB UR QL STRIP: NEGATIVE
HYALINE CASTS #/AREA URNS LPF: ABNORMAL /LPF
KETONES UR QL STRIP: NEGATIVE
LEUKOCYTE ESTERASE UR QL STRIP: NEGATIVE
N GONORRHOEA RRNA SPEC QL NAA+PROBE: NOT DETECTED
NITRITE UR QL STRIP: NEGATIVE
PH UR STRIP: 5.5 [PH] (ref 5–8)
PROT UR QL STRIP: NEGATIVE
RBC #/AREA URNS HPF: ABNORMAL /HPF
SERVICE CMNT-IMP: ABNORMAL
SP GR UR STRIP: ABNORMAL (ref 1–1.03)
SQUAMOUS #/AREA URNS HPF: ABNORMAL /HPF
WBC #/AREA URNS HPF: ABNORMAL /HPF

## 2022-10-27 RX ORDER — LANCETS
EACH MISCELLANEOUS
Qty: 200 EACH | Refills: 2 | Status: SHIPPED | OUTPATIENT
Start: 2022-10-27

## 2022-10-27 RX ORDER — INSULIN PUMP SYRINGE, 3 ML
EACH MISCELLANEOUS
Qty: 1 EACH | Refills: 0 | Status: SHIPPED | OUTPATIENT
Start: 2022-10-27 | End: 2023-10-25

## 2022-10-30 LAB
ALBUMIN SERPL-MCNC: 4.4 G/DL (ref 3.6–5.1)
ALBUMIN/GLOB SERPL: 1.4 (CALC) (ref 1–2.5)
ALP SERPL-CCNC: 135 U/L (ref 37–153)
ALT SERPL-CCNC: 13 U/L (ref 6–29)
AST SERPL-CCNC: 13 U/L (ref 10–35)
BASOPHILS # BLD AUTO: 39 CELLS/UL (ref 0–200)
BASOPHILS NFR BLD AUTO: 0.5 %
BILIRUB SERPL-MCNC: 0.6 MG/DL (ref 0.2–1.2)
BUN SERPL-MCNC: 12 MG/DL (ref 7–25)
BUN/CREAT SERPL: ABNORMAL (CALC) (ref 6–22)
CALCIUM SERPL-MCNC: 9.3 MG/DL (ref 8.6–10.4)
CHLORIDE SERPL-SCNC: 103 MMOL/L (ref 98–110)
CHOLEST SERPL-MCNC: 154 MG/DL
CHOLEST/HDLC SERPL: 3.1 (CALC)
CO2 SERPL-SCNC: 26 MMOL/L (ref 20–32)
CREAT SERPL-MCNC: 0.54 MG/DL (ref 0.5–1.03)
EGFR: 107 ML/MIN/1.73M2
EOSINOPHIL # BLD AUTO: 47 CELLS/UL (ref 15–500)
EOSINOPHIL NFR BLD AUTO: 0.6 %
ERYTHROCYTE [DISTWIDTH] IN BLOOD BY AUTOMATED COUNT: 13.1 % (ref 11–15)
GLOBULIN SER CALC-MCNC: 3.1 G/DL (CALC) (ref 1.9–3.7)
GLUCOSE SERPL-MCNC: 319 MG/DL (ref 65–99)
HBA1C MFR BLD: >14 % OF TOTAL HGB
HCT VFR BLD AUTO: 40 % (ref 35–45)
HCV AB S/CO SERPL IA: 0.06
HCV AB SERPL QL IA: NORMAL
HDLC SERPL-MCNC: 50 MG/DL
HGB BLD-MCNC: 13.5 G/DL (ref 11.7–15.5)
HIV 1+2 AB+HIV1 P24 AG SERPL QL IA: NORMAL
HSV1 IGM SER QL IF: NEGATIVE
HSV2 IGM SER QL IF: NEGATIVE
LDLC SERPL CALC-MCNC: 85 MG/DL (CALC)
LYMPHOCYTES # BLD AUTO: 2067 CELLS/UL (ref 850–3900)
LYMPHOCYTES NFR BLD AUTO: 26.5 %
MCH RBC QN AUTO: 29.3 PG (ref 27–33)
MCHC RBC AUTO-ENTMCNC: 33.8 G/DL (ref 32–36)
MCV RBC AUTO: 87 FL (ref 80–100)
MONOCYTES # BLD AUTO: 382 CELLS/UL (ref 200–950)
MONOCYTES NFR BLD AUTO: 4.9 %
NEUTROPHILS # BLD AUTO: 5265 CELLS/UL (ref 1500–7800)
NEUTROPHILS NFR BLD AUTO: 67.5 %
NONHDLC SERPL-MCNC: 104 MG/DL (CALC)
PLATELET # BLD AUTO: 214 THOUSAND/UL (ref 140–400)
PMV BLD REES-ECKER: 12.3 FL (ref 7.5–12.5)
POTASSIUM SERPL-SCNC: 4.4 MMOL/L (ref 3.5–5.3)
PROT SERPL-MCNC: 7.5 G/DL (ref 6.1–8.1)
RBC # BLD AUTO: 4.6 MILLION/UL (ref 3.8–5.1)
SODIUM SERPL-SCNC: 138 MMOL/L (ref 135–146)
TRIGL SERPL-MCNC: 93 MG/DL
TSH SERPL-ACNC: 0.92 MIU/L (ref 0.4–4.5)
WBC # BLD AUTO: 7.8 THOUSAND/UL (ref 3.8–10.8)

## 2022-11-01 ENCOUNTER — TELEPHONE (OUTPATIENT)
Dept: PRIMARY CARE CLINIC | Facility: CLINIC | Age: 57
End: 2022-11-01
Payer: MEDICAID

## 2022-11-01 DIAGNOSIS — E11.9 TYPE 2 DIABETES MELLITUS WITHOUT COMPLICATION, WITHOUT LONG-TERM CURRENT USE OF INSULIN: Primary | ICD-10-CM

## 2022-11-01 DIAGNOSIS — B37.31 VAGINAL YEAST INFECTION: ICD-10-CM

## 2022-11-01 RX ORDER — METFORMIN HYDROCHLORIDE 500 MG/1
500 TABLET ORAL 2 TIMES DAILY WITH MEALS
Qty: 60 TABLET | Refills: 5 | Status: SHIPPED | OUTPATIENT
Start: 2022-11-01 | End: 2023-01-26 | Stop reason: SDUPTHER

## 2022-11-01 RX ORDER — FLUCONAZOLE 150 MG/1
150 TABLET ORAL DAILY
Qty: 2 TABLET | Refills: 0 | Status: SHIPPED | OUTPATIENT
Start: 2022-11-01 | End: 2022-11-03

## 2022-11-01 NOTE — TELEPHONE ENCOUNTER
----- Message from Nu Siddiqi NP sent at 11/1/2022  9:30 AM CDT -----  Please notify patient her blood sugar is still really elevated at 319 and A1C is >14 so she needs to start an additional medication to get her blood sugar better controlled. She also needs to cut back on eating bread, rice, pasta, potatoes and high sugar foods and drinks. I have ordered metformin 500 mg for her to begin taking twice every day and she is to continue taking the Januvia every day as well. All of her STD tests came back negative which is good. Her urine does show a lot of sugar in it and that is due to her high blood sugar. The high blood sugars causes yeast infections which is likely the vaginal discomfort she is experiencing so I have ordered diflucan for her to take as well to help with those symptoms. Let her know we will discuss all of her results in detail at her next appt and recheck her blood sugar at that time as well.

## 2022-11-01 NOTE — PROGRESS NOTES
Please notify patient her blood sugar is still really elevated at 319 and A1C is >14 so she needs to start an additional medication to get her blood sugar better controlled. She also needs to cut back on eating bread, rice, pasta, potatoes and high sugar foods and drinks. I have ordered metformin 500 mg for her to begin taking twice every day and she is to continue taking the Januvia every day as well. All of her STD tests came back negative which is good. Her urine does show a lot of sugar in it and that is due to her high blood sugar. The high blood sugars causes yeast infections which is likely the vaginal discomfort she is experiencing so I have ordered diflucan for her to take as well to help with those symptoms. Let her know we will discuss all of her results in detail at her next appt and recheck her blood sugar at that time as well.

## 2023-01-04 ENCOUNTER — TELEPHONE (OUTPATIENT)
Dept: PRIMARY CARE CLINIC | Facility: CLINIC | Age: 58
End: 2023-01-04
Payer: MEDICAID

## 2023-01-04 NOTE — TELEPHONE ENCOUNTER
Pt has no  set up                  ----- Message from Maria M Marshall sent at 1/4/2023  8:25 AM CST -----  Contact: self  Type:  Sooner Apoointment Request    Caller is requesting a sooner appointment.  Caller declined first available appointment listed below.  Caller will not accept being placed on the waitlist and is requesting a message be sent to doctor.  Name of Caller: Patient  When is the first available appointment?  Symptoms: very sick  Would the patient rather a call back or a [facility]? Call back  Best Call Back Number: [phone]  Additional Information:

## 2023-01-26 ENCOUNTER — OFFICE VISIT (OUTPATIENT)
Dept: PRIMARY CARE CLINIC | Facility: CLINIC | Age: 58
End: 2023-01-26
Payer: MEDICAID

## 2023-01-26 VITALS
HEIGHT: 63 IN | SYSTOLIC BLOOD PRESSURE: 110 MMHG | BODY MASS INDEX: 24.13 KG/M2 | RESPIRATION RATE: 18 BRPM | WEIGHT: 136.19 LBS | DIASTOLIC BLOOD PRESSURE: 68 MMHG | OXYGEN SATURATION: 99 % | HEART RATE: 83 BPM

## 2023-01-26 DIAGNOSIS — E11.9 TYPE 2 DIABETES MELLITUS WITHOUT COMPLICATION, WITHOUT LONG-TERM CURRENT USE OF INSULIN: Primary | Chronic | ICD-10-CM

## 2023-01-26 DIAGNOSIS — Z12.31 BREAST CANCER SCREENING BY MAMMOGRAM: ICD-10-CM

## 2023-01-26 DIAGNOSIS — Z12.11 COLON CANCER SCREENING: ICD-10-CM

## 2023-01-26 DIAGNOSIS — E78.00 PURE HYPERCHOLESTEROLEMIA: ICD-10-CM

## 2023-01-26 DIAGNOSIS — B37.31 VAGINAL YEAST INFECTION: ICD-10-CM

## 2023-01-26 PROCEDURE — 3061F PR NEG MICROALBUMINURIA RESULT DOCUMENTED/REVIEW: ICD-10-PCS | Mod: CPTII,S$GLB,, | Performed by: NURSE PRACTITIONER

## 2023-01-26 PROCEDURE — 3074F SYST BP LT 130 MM HG: CPT | Mod: CPTII,S$GLB,, | Performed by: NURSE PRACTITIONER

## 2023-01-26 PROCEDURE — 99214 PR OFFICE/OUTPT VISIT, EST, LEVL IV, 30-39 MIN: ICD-10-PCS | Mod: S$GLB,,, | Performed by: NURSE PRACTITIONER

## 2023-01-26 PROCEDURE — 3066F NEPHROPATHY DOC TX: CPT | Mod: CPTII,S$GLB,, | Performed by: NURSE PRACTITIONER

## 2023-01-26 PROCEDURE — 3074F PR MOST RECENT SYSTOLIC BLOOD PRESSURE < 130 MM HG: ICD-10-PCS | Mod: CPTII,S$GLB,, | Performed by: NURSE PRACTITIONER

## 2023-01-26 PROCEDURE — 1160F RVW MEDS BY RX/DR IN RCRD: CPT | Mod: CPTII,S$GLB,, | Performed by: NURSE PRACTITIONER

## 2023-01-26 PROCEDURE — 99214 OFFICE O/P EST MOD 30 MIN: CPT | Mod: S$GLB,,, | Performed by: NURSE PRACTITIONER

## 2023-01-26 PROCEDURE — 1159F PR MEDICATION LIST DOCUMENTED IN MEDICAL RECORD: ICD-10-PCS | Mod: CPTII,S$GLB,, | Performed by: NURSE PRACTITIONER

## 2023-01-26 PROCEDURE — 3008F BODY MASS INDEX DOCD: CPT | Mod: CPTII,S$GLB,, | Performed by: NURSE PRACTITIONER

## 2023-01-26 PROCEDURE — 1159F MED LIST DOCD IN RCRD: CPT | Mod: CPTII,S$GLB,, | Performed by: NURSE PRACTITIONER

## 2023-01-26 PROCEDURE — 3078F DIAST BP <80 MM HG: CPT | Mod: CPTII,S$GLB,, | Performed by: NURSE PRACTITIONER

## 2023-01-26 PROCEDURE — 3046F PR MOST RECENT HEMOGLOBIN A1C LEVEL > 9.0%: ICD-10-PCS | Mod: CPTII,S$GLB,, | Performed by: NURSE PRACTITIONER

## 2023-01-26 PROCEDURE — 3008F PR BODY MASS INDEX (BMI) DOCUMENTED: ICD-10-PCS | Mod: CPTII,S$GLB,, | Performed by: NURSE PRACTITIONER

## 2023-01-26 PROCEDURE — 1160F PR REVIEW ALL MEDS BY PRESCRIBER/CLIN PHARMACIST DOCUMENTED: ICD-10-PCS | Mod: CPTII,S$GLB,, | Performed by: NURSE PRACTITIONER

## 2023-01-26 PROCEDURE — 3061F NEG MICROALBUMINURIA REV: CPT | Mod: CPTII,S$GLB,, | Performed by: NURSE PRACTITIONER

## 2023-01-26 PROCEDURE — 3078F PR MOST RECENT DIASTOLIC BLOOD PRESSURE < 80 MM HG: ICD-10-PCS | Mod: CPTII,S$GLB,, | Performed by: NURSE PRACTITIONER

## 2023-01-26 PROCEDURE — 3046F HEMOGLOBIN A1C LEVEL >9.0%: CPT | Mod: CPTII,S$GLB,, | Performed by: NURSE PRACTITIONER

## 2023-01-26 PROCEDURE — 3066F PR DOCUMENTATION OF TREATMENT FOR NEPHROPATHY: ICD-10-PCS | Mod: CPTII,S$GLB,, | Performed by: NURSE PRACTITIONER

## 2023-01-26 RX ORDER — FLUCONAZOLE 150 MG/1
150 TABLET ORAL DAILY
Qty: 2 TABLET | Refills: 0 | Status: SHIPPED | OUTPATIENT
Start: 2023-01-26 | End: 2023-01-28

## 2023-01-26 RX ORDER — METFORMIN HYDROCHLORIDE 500 MG/1
500 TABLET ORAL 2 TIMES DAILY WITH MEALS
Qty: 60 TABLET | Refills: 5 | Status: SHIPPED | OUTPATIENT
Start: 2023-01-26 | End: 2023-04-25 | Stop reason: SDUPTHER

## 2023-01-26 NOTE — PATIENT INSTRUCTIONS
"RTC in 3 months for F/U or sooner if needed.    Keep appts with specialists as scheduled.    Instructed patient to report to nearest ER or call 911 if begins to have difficulty breathing, turning blue, chest pain, B/P < 80/60 or >170/100, palpitations, syncope, extreme weakness, or severe H/A. Patient verbalized understanding.      Patient Education       Diabetes and Diet   The Basics   Written by the doctors and editors at [facility]   Why is diet important in diabetes? -- Diet is important because it is part of diabetes treatment. Many people need to change what they eat and how much they eat to help treat their diabetes. It is important for people to treat their diabetes so that they:  Keep their blood sugar at or near a normal level  Prevent long-term problems, such as heart or kidney problems, that can happen in people with diabetes  Changing your diet can also help treat obesity, high blood pressure, and high cholesterol. These conditions can affect people with diabetes and can lead to future problems, such as heart attacks or strokes.  Who will work with me to change my diet? -- Your doctor or nurse will work with you to make a food plan to change your diet. They might also recommend that you work with a "dietitian." A dietitian is an expert on food and eating.  Do I need to eat at the same times every day? -- When and how often you should eat depends, in part, on the diabetes medicines you take. For example:  People who take about the same amount of insulin at the same time each day (called a "fixed regimen") should eat meals at the same times. This is also true for people who take pills that increase insulin levels, such as sulfonylureas. Eating meals at the same time every day helps prevent low blood sugar.  People who adjust the dose and timing of their insulin each day (called a "flexible regimen") do not always have to eat meals at the same time. That's because they can time their insulin dose for before " "they plan to eat, and also adjust the dose for how much they plan to eat.  People who take medicines that don't usually cause low blood sugar, such as metformin, don't have to eat meals at the same time every day.  What do I need to think about when planning what to eat? -- Our bodies break down the food we eat into small pieces called carbohydrates, proteins, and fats.  When planning what to eat, people with diabetes need to think about:  Carbohydrates (or "carbs") - Carbohydrates, which are sugars that our bodies use for energy, can raise a person's blood sugar level. Your doctor, nurse, or dietitian will tell you how many carbohydrates you should eat at each meal or snack. Foods that have carbohydrates include:  Bread, pasta, and rice  Vegetables and fruits  Dairy foods  Foods and drinks with added sugar  It is best to get your carbohydrates from fruits, vegetables, whole grains, and low-fat milk. It is best to avoid drinks with added sugar, like soda, juices, and sports drinks.   Protein - Your doctor, nurse, or dietitian will tell you how much protein you should eat each day. It is best to eat lean meats, fish, eggs, beans, peas, soy products, nuts, and seeds.  Fats - The type of fat you eat is more important than the amount of fat. "Saturated" and "trans" fats can increase your risk for heart problems, like a heart attack.  Foods that have saturated fats include meat, butter, cheese, and ice cream.  Foods that have trans fats include processed food with "partially hydrogenated oils" on the ingredient list. This may include fried foods, store bought cookies, muffins, pies, and cakes.  "Monounsaturated" and "polyunsaturated" fats are better for you. Foods with these types of fat include fish, avocado, olive oil, and nuts.  Calories - People need to eat a certain amount of calories each day to keep their weight the same. People who are overweight and want to lose weight need to eat fewer calories each day.  Fiber " - Eating foods with a lot of fiber can help control a person's blood sugar level. Foods that have a lot of fiber include apples, green beans, peas, beans, lentils, nuts, oatmeal, and whole grains.  Salt - People who have high blood pressure should not eat foods that contain a lot of salt (also called sodium). People with high blood pressure should also eat healthy foods, such as fruits, vegetables, and low-fat dairy foods.  Alcohol - Having more than 1 drink (for women) or 2 drinks (for men) a day can raise blood sugar levels. Also, drinks that have fruit juice or soda in them can raise blood sugar levels.  What can I do if I need to lose weight? -- If you need to lose weight, you can:  Exercise - Try to get at least 30 minutes of physical activity a day, most days of the week. Even gentle exercise, like walking, is good for your health. Some people with diabetes need to change their medicine dose before they exercise. They might also need to check their blood sugar levels before and after exercising.  Eat fewer calories - Your doctor, nurse, or dietitian can tell you how many calories you should eat each day in order to lose weight.  If you are worried about your weight, size, or shape, talk with your doctor, nurse, or dietitian. They can help you make changes to improve your health.  Can I eat the same foods as my family? -- Yes. You do not need to eat special foods if you have diabetes. You and your family can eat the same foods. Changing your diet is mostly about eating healthy foods and not eating too much.  What are the other parts of diabetes treatment? -- Besides changing your diet, the other parts of diabetes treatment are:  Exercise  Medicines  Some people with diabetes need to learn how to match their diet and exercise with their medicine dose. For example, people who use insulin might need to choose the dose of insulin they give themselves. To choose their dose, they need to think about:  What they plan  to eat at the next meal  How much exercise they plan to do  What their blood sugar level is  If the diet and exercise do not match the medicine dose, a person's blood sugar level can get too low or too high. Blood sugar levels that are too low or too high can cause problems.  All topics are updated as new evidence becomes available and our peer review process is complete.  This topic retrieved from [employer] on: Sep 21, 2021.  Topic 52796 Version 7.0  Release: 29.4.2 - C29.263  © 2021 UpToDate, Inc. and/or its affiliates. All rights reserved.  Consumer Information Use and Disclaimer   This information is not specific medical advice and does not replace information you receive from your health care provider. This is only a brief summary of general information. It does NOT include all information about conditions, illnesses, injuries, tests, procedures, treatments, therapies, discharge instructions or life-style choices that may apply to you. You must talk with your health care provider for complete information about your health and treatment options. This information should not be used to decide whether or not to accept your health care provider's advice, instructions or recommendations. Only your health care provider has the knowledge and training to provide advice that is right for you. The use of this information is governed by the [employer] End User License Agreement, available at https://www.[employer]/en/solutions/[employer]/about/hank.The use of [employer] content is governed by the [employer] Terms of Use. ©2021 UpToDate, Inc. All rights reserved.  Copyright   © 2021 UpToDate, Inc. and/or its affiliates. All rights reserved.  Patient Education       Low Cholesterol, Saturated Fat, and Trans Fat Diet   About this topic   Cholesterol, saturated fat, and trans fat are in many foods. These may raise your blood cholesterol levels. If your cholesterol is too high, this can cause health problems in your heart, liver,  "kidneys, and even your eyes. The key to lowering your risk of heart problems is to lower your bad fat intake.  Saturated fats and trans fats are the bad fats. These fats clog your arteries and raise your bad cholesterol. Saturated fats and trans fats are solid fats at room temperature. Saturated fats are animal fats. Trans fats are manmade fats. They add flavor to a lot of packaged foods. Staying away from saturated and trans fats will help your heart.  When you do eat foods with fat, make sure they have the good fats. Monounsaturated and polyunsaturated fats are good fats. These fats help raise your good cholesterol and protect your heart.  General   How to Lower Fat and Cholesterol in Your Diet   Read the labels of the foods you buy from the market to find out how much fat is present. Under 5% of total fat on a label means it is "low fat". Over 20% of total fat on a label means it is high fat.  Eat high fiber foods, like soluble fiber. This type of fiber helps lower cholesterol in the body. Choose oatmeal, fruits (like apples), beans, and nuts to get the most soluble fiber.  Eat foods high in omega-3 fatty acids like hernan seeds, walnuts, salmon, tuna, trout, herring, flaxseed, and soybeans. These foods help keep the heart healthy.  Limit your bad fat and oil intake.  Stay away from butter, stick margarine, shortening, lard, and palm and coconut oil. Pick plant-based spreads instead.  Limit mayonnaise, salad dressings, gravies, and sauces, unless it is made from low-fat ingredients.  Limit chocolate.  Do not eat high-fat processed foods like hot dogs, kelley, sausage, ham and other luncheon meats high in fat, and some frozen foods. Pick fish, chicken, turkey, and lean meats instead.  Eat more dried beans, lentils, and tofu to get your protein.  Do not eat organ meats, like liver.  Choose nonfat or low-fat milk, yogurt, and cheese.  Use light or fat-free cream cheese and sour cream.  Eat lots of fruits and " vegetables.  Pick whole grain breads, cereals, pastas, and rice.  Do not eat snacks that are high in fats like granola, cookies, pies, pastries, doughnuts, and croissants.  Stay away from deep fried foods.  Help When Cooking   Remove the fat portion of meats and the skin from poultry before cooking.  Bake, broil, grill, poach, or roast poultry, fish, and lean meats.  Drain and throw away the fat that drains out of meat as you cook it.  Try to add little or no fat to foods.  Use olive or canola oil for cooking or baking.  Steam your vegetables.  Use herbs or no-oil marinades to flavor foods.         Who should use this diet?   This diet is for people who are at high risk of getting health problems like heart disease, high blood pressure, diabetes, and others. This diet is also good for all people to follow to keep your heart healthy.  What foods are good to eat?   Foods with good fats are:  Canola, peanut, and olive oil  Safflower, soybean, and corn oil  Walnuts, almonds, cashews, and peanuts  Pumpkin and sunflower seeds  [place] and tuna  Tofu  Soymilk  Avocado  What foods should be limited or avoided?   Stay away from these types of foods that have saturated fats:  Whole fat dairy products like cheese, ice cream, whole milk, and cream  Palm and coconut oils  High-fat meats like beef, lamb, poultry with the skin, kelley, and sausage  Butter and lard  Stay away from these types of foods that may have trans fat:  Cookies, cakes, candy, doughnuts, baked goods, muffins, pizza dough, and pie crusts that are packaged  Fried foods  Frozen dinners  Chips and crackers  Microwave popcorn  Stick margarine and vegetable shortenings  Helpful tips   To help stay away from saturated fat:  Pick lean cuts of meat  Take the skin off chicken and turkey or pick skinless  Pick low-fat cheese, milk, and ice cream  Use liquid oils when cooking and baking, such as olive oil and canola oil  To help stay away from trans fat:  Look at your  labels. Choose foods with 0% trans fat. Read the ingredient list. Avoid foods with partially hydrogenated oil in the ingredient list. This means there is trans fat in the product.  Where can I learn more?   American Heart Association   http://www.heart.org/HEARTORG/Conditions/Cholesterol/PreventionTreatmentofHighCholesterol/Know-Your-Fats_[facility]_305628_Article.jsp   Last Reviewed Date   2021-10-05  Consumer Information Use and Disclaimer   This information is not specific medical advice and does not replace information you receive from your health care provider. This is only a brief summary of general information. It does NOT include all information about conditions, illnesses, injuries, tests, procedures, treatments, therapies, discharge instructions or life-style choices that may apply to you. You must talk with your health care provider for complete information about your health and treatment options. This information should not be used to decide whether or not to accept your health care providers advice, instructions or recommendations. Only your health care provider has the knowledge and training to provide advice that is right for you.   Copyright   Copyright © 2021 UpToDate, Inc. and its affiliates and/or licensors. All rights reserved.

## 2023-01-26 NOTE — PROGRESS NOTES
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 crusts that are packaged  Fried foods  Frozen dinners  Chips and crackers  Microwave popcorn  Stick margarine and vegetable shortenings  Helpful tips   To help stay away from saturated fat:  Pick lean cuts of meat  Take the skin off chicken and turkey or pick skinless  Pick low-fat cheese, milk, and ice cream  Use liquid oils when cooking and baking, such as olive oil and canola oil  To help stay away from trans fat:  Look at your labels. Choose foods with 0% trans fat. Read the ingredient list. Avoid foods with partially hydrogenated oil in the ingredient list. This means there is trans fat in the product.  Where can I learn more?   American Heart Association   http://www.heart.org/HEARTORG/Conditions/Cholesterol/PreventionTreatmentofHighCholesterol/Know-Your-Fats_[facility]_305628_Article.jsp   Last Reviewed Date   2021-10-05  Consumer Information Use and Disclaimer   This information is not specific medical advice and does not replace information you receive from your health care provider. This is only a brief summary of general information. It does NOT include all information about conditions, illnesses, injuries, tests, procedures, treatments, therapies, discharge instructions or life-style choices that may apply to you. You must talk with your health care provider for complete information about your health and treatment options. This information  should not be used to decide whether or not to accept your health care providers advice, instructions or recommendations. Only your health care provider has the knowledge and training to provide advice that is right for you.   Copyright   Copyright © 2021 UpToDate, Inc. and its affiliates and/or licensors. All rights reserved.      Risks, benefits, and alternatives discussed with patient, Patient verbalized understanding of discussed plan of care. Asked patient if any further questions, answered no.    Future Appointments   Date Time Provider Department Center   2/17/2023  8:30 AM NURSE COMFORT [facility] SURGERY [facility]  [address]   2/24/2023 10:00 AM COMFORT SURGERY, [facility] GEN SURG [facility]  [address]   5/4/2023 11:20 AM Nu Siddiqi NP [facility] Gibran Siddiqi NP

## 2023-01-28 LAB
ALBUMIN SERPL-MCNC: 4.2 G/DL (ref 3.6–5.1)
ALBUMIN/CREAT UR: 13 MCG/MG CREAT
ALBUMIN/GLOB SERPL: 1.5 (CALC) (ref 1–2.5)
ALP SERPL-CCNC: 100 U/L (ref 37–153)
ALT SERPL-CCNC: 14 U/L (ref 6–29)
AST SERPL-CCNC: 13 U/L (ref 10–35)
BILIRUB SERPL-MCNC: 0.3 MG/DL (ref 0.2–1.2)
BUN SERPL-MCNC: 10 MG/DL (ref 7–25)
BUN/CREAT SERPL: 21 (CALC) (ref 6–22)
CALCIUM SERPL-MCNC: 9 MG/DL (ref 8.6–10.4)
CHLORIDE SERPL-SCNC: 104 MMOL/L (ref 98–110)
CO2 SERPL-SCNC: 28 MMOL/L (ref 20–32)
CREAT SERPL-MCNC: 0.48 MG/DL (ref 0.5–1.03)
CREAT UR-MCNC: 52 MG/DL (ref 20–275)
EGFR: 110 ML/MIN/1.73M2
GLOBULIN SER CALC-MCNC: 2.8 G/DL (CALC) (ref 1.9–3.7)
GLUCOSE SERPL-MCNC: 325 MG/DL (ref 65–99)
HBA1C MFR BLD: 13.8 % OF TOTAL HGB
MICROALBUMIN UR-MCNC: 0.7 MG/DL
POTASSIUM SERPL-SCNC: 4 MMOL/L (ref 3.5–5.3)
PROT SERPL-MCNC: 7 G/DL (ref 6.1–8.1)
SODIUM SERPL-SCNC: 137 MMOL/L (ref 135–146)

## 2023-02-06 ENCOUNTER — TELEPHONE (OUTPATIENT)
Dept: PRIMARY CARE CLINIC | Facility: CLINIC | Age: 58
End: 2023-02-06
Payer: MEDICAID

## 2023-02-06 NOTE — TELEPHONE ENCOUNTER
----- Message from Nu Siddiqi NP sent at 2/6/2023  1:07 PM CST -----  Please notify patient her A1C is still very high at 13.8 so make to take both of the diabetes medications that I have prescribed. Also, cut back on eating high carb, high sugar foods. We will recheck her A1C again in 3 months. All of her other labs are normal.

## 2023-02-06 NOTE — PROGRESS NOTES
Please notify patient her A1C is still very high at 13.8 so make to take both of the diabetes medications that I have prescribed. Also, cut back on eating high carb, high sugar foods. We will recheck her A1C again in 3 months. All of her other labs are normal.

## 2023-02-14 ENCOUNTER — TELEPHONE (OUTPATIENT)
Dept: SURGERY | Facility: CLINIC | Age: 58
End: 2023-02-14
Payer: MEDICAID

## 2023-02-14 DIAGNOSIS — Z12.11 COLON CANCER SCREENING: Primary | ICD-10-CM

## 2023-02-14 NOTE — TELEPHONE ENCOUNTER
[place] [place] [facility]  [address] [place] [address]  Phone: [phone]  Fax: [phone]    History & Physical         Provider: Dr. ROCK Arredondo    Patient Name: Shelby LEWIS (age):1965  57 y.o.           Gender: female   Phone: [phone]     Referring Physician:  Nu Siddiqi NP    Vital Signs:   Height : 5-3  Weight : 136 lb 3 oz  BMI : 24.13 kg/m²    Plan: Schedule pt for screening colonoscopy.    Encounter Diagnosis   Name Primary?    Colon cancer screening Yes           History:      Past Medical History:   Diagnosis Date    Diabetes mellitus, type 2       Past Surgical History:   Procedure Laterality Date    APPENDECTOMY       SECTION        Medication List with Changes/Refills   Current Medications    BLOOD SUGAR DIAGNOSTIC STRP    To check BG twice daily, to use with insurance preferred meter    BLOOD-GLUCOSE METER KIT    To check BG twice daily, to use with insurance preferred meter.    LANCETS MISC    To check BG twice daily, to use with insurance preferred meter    METFORMIN (GLUCOPHAGE) 500 MG TABLET    Take 1 tablet (500 mg total) by mouth 2 (two) times daily with meals.    ROSUVASTATIN (CRESTOR) 5 MG TABLET    Take 1 tablet (5 mg total) by mouth once daily.    SITAGLIPTIN PHOSPHATE (JANUVIA) 50 MG TAB    Take 1 tablet (50 mg total) by mouth once daily.      Review of patient's allergies indicates:  No Known Allergies   Family History   Problem Relation Age of Onset    Diabetes Mother       Social History     Tobacco Use    Smoking status: Never    Smokeless tobacco: Never   Substance Use Topics    Alcohol use: Not Currently    Drug use: Never        Physical Examination:     General Appearance:___________________________  HEENT:  _____________________________________  Abdomen:____________________________________  Heart:________________________________________  Lungs:_______________________________________  Extremities:___________________________________  Skin:_________________________________________  Endocrine:____________________________________  Genitourinary:_________________________________  Neurological:__________________________________      Patient has been evaluated immediately prior to sedation and is medically cleared for endoscopy with IVCS as an ASA class: ______      Physician Signature: _________________________       Date: ________  Time: ________

## 2023-02-17 ENCOUNTER — CLINICAL SUPPORT (OUTPATIENT)
Dept: SURGERY | Facility: CLINIC | Age: 58
End: 2023-02-17
Payer: MEDICAID

## 2023-02-17 DIAGNOSIS — Z12.11 COLON CANCER SCREENING: Primary | ICD-10-CM

## 2023-02-17 PROCEDURE — 99499 NO LOS: ICD-10-PCS | Mod: S$GLB,,, | Performed by: SURGERY

## 2023-02-17 PROCEDURE — 99499 UNLISTED E&M SERVICE: CPT | Mod: S$GLB,,, | Performed by: SURGERY

## 2023-04-25 DIAGNOSIS — E11.9 TYPE 2 DIABETES MELLITUS WITHOUT COMPLICATION, WITHOUT LONG-TERM CURRENT USE OF INSULIN: Chronic | ICD-10-CM

## 2023-04-25 RX ORDER — METFORMIN HYDROCHLORIDE 500 MG/1
500 TABLET ORAL 2 TIMES DAILY WITH MEALS
Qty: 60 TABLET | Refills: 5 | Status: SHIPPED | OUTPATIENT
Start: 2023-04-25

## 2023-05-04 ENCOUNTER — OFFICE VISIT (OUTPATIENT)
Dept: PRIMARY CARE CLINIC | Facility: CLINIC | Age: 58
End: 2023-05-04
Payer: MEDICAID

## 2023-05-04 VITALS
HEART RATE: 81 BPM | BODY MASS INDEX: 24.45 KG/M2 | HEIGHT: 63 IN | DIASTOLIC BLOOD PRESSURE: 73 MMHG | SYSTOLIC BLOOD PRESSURE: 132 MMHG | OXYGEN SATURATION: 100 % | WEIGHT: 138 LBS

## 2023-05-04 DIAGNOSIS — E11.9 TYPE 2 DIABETES MELLITUS WITHOUT COMPLICATION, WITHOUT LONG-TERM CURRENT USE OF INSULIN: Primary | Chronic | ICD-10-CM

## 2023-05-04 DIAGNOSIS — E78.49 OTHER HYPERLIPIDEMIA: ICD-10-CM

## 2023-05-04 DIAGNOSIS — E78.00 PURE HYPERCHOLESTEROLEMIA: ICD-10-CM

## 2023-05-04 PROCEDURE — 3046F PR MOST RECENT HEMOGLOBIN A1C LEVEL > 9.0%: ICD-10-PCS | Mod: CPTII,S$GLB,, | Performed by: NURSE PRACTITIONER

## 2023-05-04 PROCEDURE — 3008F BODY MASS INDEX DOCD: CPT | Mod: CPTII,S$GLB,, | Performed by: NURSE PRACTITIONER

## 2023-05-04 PROCEDURE — 3008F PR BODY MASS INDEX (BMI) DOCUMENTED: ICD-10-PCS | Mod: CPTII,S$GLB,, | Performed by: NURSE PRACTITIONER

## 2023-05-04 PROCEDURE — 1159F MED LIST DOCD IN RCRD: CPT | Mod: CPTII,S$GLB,, | Performed by: NURSE PRACTITIONER

## 2023-05-04 PROCEDURE — 3078F DIAST BP <80 MM HG: CPT | Mod: CPTII,S$GLB,, | Performed by: NURSE PRACTITIONER

## 2023-05-04 PROCEDURE — 3078F PR MOST RECENT DIASTOLIC BLOOD PRESSURE < 80 MM HG: ICD-10-PCS | Mod: CPTII,S$GLB,, | Performed by: NURSE PRACTITIONER

## 2023-05-04 PROCEDURE — 1160F RVW MEDS BY RX/DR IN RCRD: CPT | Mod: CPTII,S$GLB,, | Performed by: NURSE PRACTITIONER

## 2023-05-04 PROCEDURE — 3075F SYST BP GE 130 - 139MM HG: CPT | Mod: CPTII,S$GLB,, | Performed by: NURSE PRACTITIONER

## 2023-05-04 PROCEDURE — 3066F NEPHROPATHY DOC TX: CPT | Mod: CPTII,S$GLB,, | Performed by: NURSE PRACTITIONER

## 2023-05-04 PROCEDURE — 1160F PR REVIEW ALL MEDS BY PRESCRIBER/CLIN PHARMACIST DOCUMENTED: ICD-10-PCS | Mod: CPTII,S$GLB,, | Performed by: NURSE PRACTITIONER

## 2023-05-04 PROCEDURE — 3061F NEG MICROALBUMINURIA REV: CPT | Mod: CPTII,S$GLB,, | Performed by: NURSE PRACTITIONER

## 2023-05-04 PROCEDURE — 3066F PR DOCUMENTATION OF TREATMENT FOR NEPHROPATHY: ICD-10-PCS | Mod: CPTII,S$GLB,, | Performed by: NURSE PRACTITIONER

## 2023-05-04 PROCEDURE — 3046F HEMOGLOBIN A1C LEVEL >9.0%: CPT | Mod: CPTII,S$GLB,, | Performed by: NURSE PRACTITIONER

## 2023-05-04 PROCEDURE — 3075F PR MOST RECENT SYSTOLIC BLOOD PRESS GE 130-139MM HG: ICD-10-PCS | Mod: CPTII,S$GLB,, | Performed by: NURSE PRACTITIONER

## 2023-05-04 PROCEDURE — 3061F PR NEG MICROALBUMINURIA RESULT DOCUMENTED/REVIEW: ICD-10-PCS | Mod: CPTII,S$GLB,, | Performed by: NURSE PRACTITIONER

## 2023-05-04 PROCEDURE — 99214 OFFICE O/P EST MOD 30 MIN: CPT | Mod: S$GLB,,, | Performed by: NURSE PRACTITIONER

## 2023-05-04 PROCEDURE — 1159F PR MEDICATION LIST DOCUMENTED IN MEDICAL RECORD: ICD-10-PCS | Mod: CPTII,S$GLB,, | Performed by: NURSE PRACTITIONER

## 2023-05-04 PROCEDURE — 99214 PR OFFICE/OUTPT VISIT, EST, LEVL IV, 30-39 MIN: ICD-10-PCS | Mod: S$GLB,,, | Performed by: NURSE PRACTITIONER

## 2023-05-04 RX ORDER — ROSUVASTATIN CALCIUM 5 MG/1
5 TABLET, COATED ORAL DAILY
Qty: 90 TABLET | Refills: 3 | Status: SHIPPED | OUTPATIENT
Start: 2023-05-04

## 2023-05-04 RX ORDER — SOD SULF/POT CHLORIDE/MAG SULF 1.479 G
TABLET ORAL
COMMUNITY
Start: 2023-02-14

## 2023-05-04 NOTE — PROGRESS NOTES
Subjective:       Patient ID: Shelby Walls is a 57 y.o. female.    Chief Complaint: Follow-up    HPI: Shelby is a 57 y.o. female who presents for F/U.  on [place].    Patient's last glucose on labs were 319 and A1C was >15.0 so she was started back on metformin along with the Januvia. Also instructed to eat a diabetic diet. Patient admits renae bennettot always take her medication every day and she is confused on what type of diet is a diabetic diet. Spent some time discussing her diabetes medication and diet and she states understanding. A1C and CMP ordered today, will see results.    DMII - Patients is non compliant with managing her Diabetes. She has previously taken Metformin which controlled her blood sugar better but she stopped taking it due to the medication making her lose too much weight she says. Due to recent high glucose and A1C, she was is now taking Metformin and Januvia. She does not check BG daily but she says she will start. She has had recurrent yeast infections due to her elevated blood sugars.    HLD - controlled now with crestor. Last LDL was 85 in October.    She has not had a PAP smear in several years. Refuses to see GYN provider right now.    Due for mammogram and colonoscopy. Mammogram and colonoscopy previously ordered but she missed both appts. Numbers provided to call and schedule Mammogram. Instructed to call Dr. Arredondo office to schedule her Colonoscopy since she missed her last appt.    UTD with COVID vaccine.          Past Medical History:   Diagnosis Date    Diabetes mellitus, type 2        Past Surgical History:   Procedure Laterality Date    APPENDECTOMY       SECTION         Family History   Problem Relation Age of Onset    Diabetes Mother        Social History     Tobacco Use    Smoking status: Never    Smokeless tobacco: Never   Substance Use Topics    Alcohol use: Not Currently    Drug use: Never       Patient Active Problem List   Diagnosis    Type 2  "diabetes mellitus without complication, without long-term current use of insulin    Pure hypercholesterolemia    Genital herpes simplex       Immunization History   Administered Date(s) Administered    COVID-19, MRNA, LN-S, PF (Pfizer) (Purple Cap) 09/03/2021, 09/27/2021    Influenza - Trivalent (ADULT) 10/25/2007    MMR 04/27/2011    Tdap 01/12/2006, 04/27/2011    Varicella 04/27/2011           Review of Systems   Constitutional:  Negative for activity change, appetite change, chills, diaphoresis, fatigue and fever.   HENT:  Negative for tinnitus and trouble swallowing.    Eyes:  Negative for visual disturbance.   Respiratory:  Negative for cough, chest tightness, shortness of breath and wheezing.    Cardiovascular:  Negative for chest pain, palpitations and leg swelling.   Gastrointestinal:  Negative for abdominal distention, abdominal pain, blood in stool, constipation, diarrhea, nausea and vomiting.   Endocrine: Positive for polydipsia, polyphagia and polyuria.   Genitourinary:  Positive for urgency and vaginal discharge. Negative for decreased urine volume, dysuria, frequency, hematuria, vaginal bleeding and vaginal pain.   Musculoskeletal:  Negative for back pain, gait problem, neck pain and neck stiffness.   Skin:  Negative for color change and rash.   Neurological:  Negative for dizziness, syncope, weakness, light-headedness, numbness and headaches.   Psychiatric/Behavioral:  Negative for behavioral problems, confusion, dysphoric mood and suicidal ideas. The patient is not nervous/anxious.    Objective:     Vitals:    05/04/23 1139   BP: 132/73   BP Location: Right arm   Patient Position: Sitting   BP Method: Medium (Automatic)   Pulse: 81   SpO2: 100%   Weight: 62.6 kg (138 lb)   Height: 5' 3" (1.6 m)         Physical Exam  Vitals and nursing note reviewed.   Constitutional:       General: She is not in acute distress.     Appearance: Normal appearance. She is not diaphoretic.   HENT:      Head: " Normocephalic and atraumatic.      Mouth/Throat:      Mouth: Mucous membranes are moist.      Pharynx: Oropharynx is clear.   Eyes:      Conjunctiva/sclera: Conjunctivae normal.      Pupils: Pupils are equal, round, and reactive to light.   Cardiovascular:      Rate and Rhythm: Normal rate and regular rhythm.   Pulmonary:      Effort: Pulmonary effort is normal.      Breath sounds: Normal breath sounds.   Abdominal:      General: There is no distension.      Tenderness: There is no abdominal tenderness.   Genitourinary:     Vagina: Vaginal discharge present.   Musculoskeletal:         General: No tenderness. Normal range of motion.      Cervical back: Normal range of motion and neck supple.      Right lower leg: No edema.      Left lower leg: No edema.   Lymphadenopathy:      Cervical: No cervical adenopathy.   Skin:     General: Skin is warm and dry.      Capillary Refill: Capillary refill takes less than 2 seconds.   Neurological:      Mental Status: She is alert and oriented to person, place, and time.   Psychiatric:         Mood and Affect: Mood and affect normal.         Behavior: Behavior normal. Behavior is cooperative.       Office Visit on 01/26/2023   Component Date Value Ref Range Status    Hemoglobin A1C 01/27/2023 13.8 (H)  <5.7 % of total Hgb Final    Glucose 01/27/2023 325 (H)  65 - 99 mg/dL Final    BUN 01/27/2023 10  7 - 25 mg/dL Final    Creatinine 01/27/2023 0.48 (L)  0.50 - 1.03 mg/dL Final    eGFR 01/27/2023 110  > OR = 60 mL/min/1.73m2 Final    BUN/Creatinine Ratio 01/27/2023 21  6 - 22 (calc) Final    Sodium 01/27/2023 137  135 - 146 mmol/L Final    Potassium 01/27/2023 4.0  3.5 - 5.3 mmol/L Final    Chloride 01/27/2023 104  98 - 110 mmol/L Final    CO2 01/27/2023 28  20 - 32 mmol/L Final    Calcium 01/27/2023 9.0  8.6 - 10.4 mg/dL Final    Total Protein 01/27/2023 7.0  6.1 - 8.1 g/dL Final    Albumin 01/27/2023 4.2  3.6 - 5.1 g/dL Final    Globulin, Total 01/27/2023 2.8  1.9 - 3.7 g/dL (calc)  Final    Albumin/Globulin Ratio 01/27/2023 1.5  1.0 - 2.5 (calc) Final    Total Bilirubin 01/27/2023 0.3  0.2 - 1.2 mg/dL Final    Alkaline Phosphatase 01/27/2023 100  37 - 153 U/L Final    AST 01/27/2023 13  10 - 35 U/L Final    ALT 01/27/2023 14  6 - 29 U/L Final    Creatinine, Urine 01/27/2023 52  20 - 275 mg/dL Final    Microalb, Ur 01/27/2023 0.7  See Note: mg/dL Final    Microalb/Creat Ratio 01/27/2023 13  <30 mcg/mg creat Final         Assessment:      1. Type 2 diabetes mellitus without complication, without long-term current use of insulin Poorly controlled   2. Other hyperlipidemia    3. Pure hypercholesterolemia            Plan:     Type 2 diabetes mellitus without complication, without long-term current use of insulin  Comments:  Continue medications daily, eat a low carb, low sugar diet, A1C ordered today, will see results  Orders:  -     SITagliptin phosphate (JANUVIA) 50 MG Tab; Take 1 tablet (50 mg total) by mouth once daily.  Dispense: 90 tablet; Refill: 1  -     Hemoglobin A1C; Future; Expected date: 05/04/2023  -     Comprehensive Metabolic Panel; Future; Expected date: 05/04/2023    Other hyperlipidemia  -     rosuvastatin (CRESTOR) 5 MG tablet; Take 1 tablet (5 mg total) by mouth once daily.  Dispense: 90 tablet; Refill: 3    Pure hypercholesterolemia           Current Outpatient Medications   Medication Sig Dispense Refill    blood sugar diagnostic Strp To check BG twice daily, to use with insurance preferred meter 200 strip 2    blood-glucose meter kit To check BG twice daily, to use with insurance preferred meter. 1 each 0    lancets Misc To check BG twice daily, to use with insurance preferred meter 200 each 2    metFORMIN (GLUCOPHAGE) 500 MG tablet Take 1 tablet (500 mg total) by mouth 2 (two) times daily with meals. 60 tablet 5    rosuvastatin (CRESTOR) 5 MG tablet Take 1 tablet (5 mg total) by mouth once daily. 90 tablet 3    SITagliptin phosphate (JANUVIA) 50 MG Tab Take 1 tablet (50 mg  "total) by mouth once daily. 90 tablet 1    SUTAB 1.479-0.188- 0.225 gram tablet        No current facility-administered medications for this visit.       Medications Discontinued During This Encounter   Medication Reason    rosuvastatin (CRESTOR) 5 MG tablet Reorder    SITagliptin phosphate (JANUVIA) 50 MG Tab Reorder         Health Maintenance   Topic Date Due    Eye Exam  Never done    Mammogram  Never done    TETANUS VACCINE  04/27/2021    Hemoglobin A1c  04/27/2023    Foot Exam  10/25/2023    Lipid Panel  10/26/2023    Low Dose Statin  05/04/2024    Hepatitis C Screening  Completed       Patient Instructions   RTC in 3 months for F/U or sooner if needed.    Patient Education       Diabetes y dieta   Conceptos Básicos   Redactado por los médicos y editores de UpToDate   ¿Por qué la dieta es importante cuando se tiene diabetes? -- La dieta es importante porque es parte del tratamiento de la diabetes. Para ayudar a tratar la diabetes, muchas personas tienen que modificar lo que comen y la cantidad que comen. Es importante que las personas traten la diabetes para que puedan:  Mantener cruz azúcar en natalia en un nivel normal o cerca de radha  Prevenir problemas a lili plazo, shahid por ejemplo problemas renales, que pueden aparecer en las personas con diabetes  La modificación de la dieta también puede ayudar a tratar la obesidad, la presión arterial bonnie y el colesterol alto. Estos padecimientos pueden afectar a las personas con diabetes y provocar problemas en el futuro, shahid infartos o accidentes cerebrovasculares (derrames).  ¿Quién trabajará conmigo para modificar mi dieta? -- Cruz médico o enfermero trabajará con usted para crear un plan de alimentación y modificar cruz dieta. También es posible que le recomiende que trabaje con un "dietista". Un dietista es un experto en alimentos y alimentación.  ¿Adela comer a las mismas horas todos los días? -- El momento y la frecuencia para alimentarse dependen, en parte, de las " "medicinas para la diabetes que use. Por ejemplo:  Las personas que usan aproximadamente la misma cantidad de insulina a la misma hora todos los días (lo que se llama "régimen fijo") deben comer siempre a la misma hora. Adeline también se aplica para las personas que christopher píldoras que aumentan los niveles de insulina, shahid las sulfonilureas. [place] a la misma hora todos los días ayuda a prevenir niveles bajos de azúcar en nat[place].  Las personas que ajustan diariamente cruz dosis de insulina y a qué hora la usan (lo que se llama "régimen flexible") no siempre tienen que comer a la misma hora. Eso se debe a que pueden programar cruz dosis de insulina para antes de cuando tengan planeado comer, y también ajustar la dosis en función de cuánto planean comer.  Las personas que usan medicinas que no suelen causar niveles bajos de azúcar en nat[place], shahid la metformina, no necesitan comer a la misma hora todos los días.  ¿Qué hosea tener en cuenta al planificar lo que comeré? -- El organismo convierte los alimentos que consumimos en partes pequeñas llamadas carbohidratos, proteínas y grasas.  Al planificar lo que comerán, las personas con diabetes deben tener en cuenta lo siguiente:  Carbohidratos - Los carbohidratos, que son azúcares que el organismo utiliza para generar energía, pueden elevar el nivel de azúcar en natalia. Cruz médico, enfermero o dietista le indicará cuántos carbohidratos debe consumir en cada comida o refrigerio. Entre los alimentos que contienen carbohidratos se incluyen:  Pan, pasta y arroz  Verduras y frutas  Lácteos  Alimentos y bebidas con azúcar agregada  Es mejor obtener los carbohidratos de las frutas, las verduras, los granos enteros y la leche descremada. Lo mejor es evitar las bebidas con azúcar agregada, shahid las gaseosas, los jugos y las bebidas deportivas.   Proteína - El médico, enfermero o dietista le dirá cuánta proteína debe consumir a diario. Lo más conveniente es consumir roxi magras, pescado, " "huevos, frijoles, arvejas, productos derivados de la soja, jarvis secos y semillas.  Grasas - El tipo de grasa que se consume es más importante que la cantidad de grasa consumida. Las grasas "saturadas" y "trans" pueden aumentar el riesgo de padecer problemas del corazón, shahid por ejemplo un infarto.  Entre los alimentos que tienen grasas saturadas se encuentran la carne, la manteca, el queso y el helado.  Entre los alimentos que tienen grasas trans se encuentran los alimentos procesados que incluyen "aceites parcialmente hidrogenados" en la lista de ingredientes, shahid los alimentos fritos y las galletas, los panecillos, los pasteles y los bizcochos que se compran en las tiendas.  Las grasas "monoinsaturadas" y "poliinsaturadas" son más saludables. Entre los alimentos que las incluyen se cuentan el pescado, el aguacate, el aceite de martell y los jarvis secos.  Calorías - Las personas deben consumir skye cantidad determinada de calorías por día para mantener cruz peso. Las que tienen sobrepeso y aruna bajar de peso, deben consumir menos calorías por día.  Fibra - Consumir alimentos con mucha fibra puede ayudar a controlar el nivel de azúcar en nat[place]. Algunos alimentos que contienen mucha fibra son las manzanas, las habichuelas, las arvejas, los frijoles, las lentejas, los jarvis secos, el cereal de phi y los granos integrales.  Diego - Las personas que tienen presión arterial bonnie no deben consumir alimentos que contengan mucha sal (también llamada sodio). También deben consumir alimentos saludables, shahid frutas, verduras y lácteos bajos en grasa.  Alcohol - Beber más de skye copa (en el thony de las mujeres) o dos (en el thony de los hombres) por día puede aumentar los niveles de azúcar en natalia. Además, las bebidas que tienen jugo de frutas o las gaseosas pueden aumentar los niveles de azúcar en natalia.  ¿Qué puedo hacer si necesito bajar de peso? -- Si necesita bajar de peso, puede hacer lo siguiente:  Ejercicio - " Intente hacer al menos 30 minutos de actividad física por día, la mayoría de los días de la semana. Incluso el ejercicio suave, shahid caminar, es quesada para la cornell. Algunas personas que padecen diabetes deben modificar cruz dosis de medicina antes de hacer ejercicio y también es posible que deban revisar cruz nivel de azúcar en nat[place] antes y después de hacer ejercicio.  Consumir menos calorías - Cruz médico, enfermero o dietista puede indicarle cuántas calorías debe consumir por día para bajar de peso.  Si está preocupado por cruz peso, tamaño o silueta, consulte a cruz médico, enfermero o dietista. Puede ayudarlo a hacer cambios para mejorar cruz cornell.  ¿Puedo consumir los mismos alimentos que mi rasheeda? -- Sí. No hace falta que consuma alimentos especiales si tiene diabetes. Pueden ser los mismos que consume cruz rasheeda. Para modificar cruz dieta, debe consumir principalmente alimentos saludables y evitar comer demasiado.  ¿Cuáles son las otras partes del tratamiento para la diabetes? -- Además de modificar cruz alimentación, las otras partes del tratamiento para la diabetes son las siguientes:  Ejercicio  Medicinas  Algunas personas diabéticas deben aprender a combinar cruz dieta y rutina de ejercicios con cruz dosis de medicina. Por ejemplo, es posible que aquellas personas que usan insulina deban regular la dosis que se van a aplicar. Para hacer esto, deben considerar lo siguiente:  Qué van a comer en la próxima comida  Cuánto ejercicio tienen pensado hacer  Cuál es cruz nivel de azúcar en nat[place]  Si la dieta y la rutina de ejercicios no coinciden con la dosis de medicina, el nivel de azúcar en natalia puede bajar o subir demasiado y esto puede producir problemas.  Todos los artículos se actualizan a medida que se descubre nueva evidencia y culmina nuestro proceso de evaluación por homólogos   Miya artículo se recuperó de UpToDate el: Sep 21, 2021.  Artículo 36202 Versión [id number].[id number]  Release: 29.4.2 - C29.263  © 2021 UpToDate,  Inc. Todos los derechos reservados.  Exención de responsabilidad y uso de la información del consumidor   Esta información no es un consejo médico específico ni es un sustituto de la información que le wendy cruz proveedor de atención médica. Es solamente un resumen breve de datos generales. NO incluye toda la información sobre padecimientos, enfermedades, lesiones, pruebas, procedimientos, tratamientos, terapias, instrucciones de bonnie u opciones de estilo de carina que podrían corresponder en cruz thony. Debe hablar con cruz proveedor de atención médica para obtener información completa sobre cruz cornell y jacquie opciones de tratamiento. Esta información no debe utilizarse para decidir si aceptar o no el consejo, las instrucciones o las recomendaciones de cruz proveedor de atención médica, y solo él posee los conocimientos y la capacitación para darle consejos adecuados para usted.El uso de radha sitio web se rige por los Términos de uso de UpToDate ©2021 [employer]. Todos los derechos reservados.  Copyright   © 2021 [employer]. Todos los derechos reservados.    Risks, benefits, and alternatives discussed with patient, Patient verbalized understanding of discussed plan of care. Asked patient if any further questions, answered no.    Future Appointments   Date Time Provider Department Center   8/3/2023  9:20 AM Nu Siddiqi NP [id number]  Gamaliel Siddiqi NP

## 2023-05-04 NOTE — PATIENT INSTRUCTIONS
"RTC in 3 months for F/U or sooner if needed.    Patient Education       Diabetes y dieta   Conceptos Básicos   Redactado por los médicos y editores de [employer]   ¿Por qué la dieta es importante cuando se tiene diabetes? -- La dieta es importante porque es parte del tratamiento de la diabetes. Para ayudar a tratar la diabetes, muchas personas tienen que modificar lo que comen y la cantidad que comen. Es importante que las personas traten la diabetes para que puedan:  Mantener cruz azúcar en [place] en un nivel normal o cerca de radha  Prevenir problemas a lili plazo, shahid por ejemplo problemas renales, que pueden aparecer en las personas con diabetes  La modificación de la dieta también puede ayudar a tratar la obesidad, la presión arterial bonnie y el colesterol alto. Estos padecimientos pueden afectar a las personas con diabetes y provocar problemas en el futuro, shahid infartos o accidentes cerebrovasculares (derrames).  ¿Quién trabajará conmigo para modificar mi dieta? -- Cruz médico o enfermero trabajará con usted para crear un plan de alimentación y modificar cruz dieta. También es posible que le recomiende que trabaje con un "dietista". Un dietista es un experto en alimentos y alimentación.  ¿Adela comer a las mismas horas todos los días? -- El momento y la frecuencia para alimentarse dependen, en parte, de las medicinas para la diabetes que use. Por ejemplo:  Las personas que usan aproximadamente la misma cantidad de insulina a la misma hora todos los días (lo que se llama "régimen fijo") deben comer siempre a la misma hora. [place] también se aplica para las personas que christopher píldoras que aumentan los niveles de insulina, shahid las sulfonilureas. [place] a la misma hora todos los días ayuda a prevenir niveles bajos de azúcar en [place].  Las personas que ajustan diariamente cruz dosis de insulina y a qué hora la usan (lo que se llama "régimen flexible") no siempre tienen que comer a la misma hora. Eso se debe a que pueden " "programar cruz dosis de insulina para antes de cuando tengan planeado comer, y también ajustar la dosis en función de cuánto planean comer.  Las personas que usan medicinas que no suelen causar niveles bajos de azúcar en [place], shahid la metformina, no necesitan comer a la misma hora todos los días.  ¿Qué hosea tener en cuenta al planificar lo que comeré? -- El organismo convierte los alimentos que consumimos en partes pequeñas llamadas carbohidratos, proteínas y grasas.  Al planificar lo que comerán, las personas con diabetes deben tener en cuenta lo siguiente:  Carbohidratos - Los carbohidratos, que son azúcares que el organismo utiliza para generar energía, pueden elevar el nivel de azúcar en [place]. Cruz médico, enfermero o dietista le indicará cuántos carbohidratos debe consumir en cada comida o refrigerio. Entre los alimentos que contienen carbohidratos se incluyen:  Pan, pasta y arroz  Verduras y frutas  Lácteos  Alimentos y bebidas con azúcar agregada  Es mejor obtener los carbohidratos de las frutas, las verduras, los granos enteros y la leche descremada. Lo mejor es evitar las bebidas con azúcar agregada, shahid las gaseosas, los jugos y las bebidas deportivas.   Proteína - El médico, enfermero o dietista le dirá cuánta proteína debe consumir a diario. Lo más conveniente es consumir roxi magras, pescado, huevos, frijoles, arvejas, productos derivados de la soja, jarvis secos y semillas.  Grasas - El tipo de grasa que se consume es más importante que la cantidad de grasa consumida. Las grasas "saturadas" y "trans" pueden aumentar el riesgo de padecer problemas del corazón, shahid por ejemplo un infarto.  Entre los alimentos que tienen grasas saturadas se encuentran la carne, la manteca, el queso y el helado.  Entre los alimentos que tienen grasas trans se encuentran los alimentos procesados que incluyen "aceites parcialmente hidrogenados" en la lista de ingredientes, shahid los alimentos fritos y las galletas, los " "panecillos, los pasteles y los bizcochos que se compran en las tiendas.  Las grasas "monoinsaturadas" y "poliinsaturadas" son más saludables. Entre los alimentos que las incluyen se cuentan el pescado, el aguacate, el aceite de martell y los jarvis secos.  Calorías - Las personas deben consumir skye cantidad determinada de calorías por día para mantener cruz peso. Las que tienen sobrepeso y aruna bajar de peso, deben consumir menos calorías por día.  Fibra - Consumir alimentos con mucha fibra puede ayudar a controlar el nivel de azúcar en [place]. Algunos alimentos que contienen mucha fibra son las manzanas, las habichuelas, las arvejas, los frijoles, las lentejas, los jarvis secos, el cereal de phi y los granos integrales.  Diego - Las personas que tienen presión arterial bonnie no deben consumir alimentos que contengan mucha sal (también llamada sodio). También deben consumir alimentos saludables, shahid frutas, verduras y lácteos bajos en grasa.  Alcohol - Beber más de skye copa (en el thony de las mujeres) o dos (en el thony de los hombres) por día puede aumentar los niveles de azúcar en [place]. Además, las bebidas que tienen jugo de frutas o las gaseosas pueden aumentar los niveles de azúcar en [place].  ¿Qué puedo hacer si necesito bajar de peso? -- Si necesita bajar de peso, puede hacer lo siguiente:  Ejercicio - Intente hacer al menos 30 minutos de actividad física por día, la mayoría de los días de la semana. Incluso el ejercicio suave, shahid caminar, es quesada para la cornell. Algunas personas que padecen diabetes deben modificar cruz dosis de medicina antes de hacer ejercicio y también es posible que deban revisar cruz nivel de azúcar en [place] antes y después de hacer ejercicio.  Consumir menos calorías - Cruz médico, enfermero o dietista puede indicarle cuántas calorías debe consumir por día para bajar de peso.  Si está preocupado por cruz peso, tamaño o silueta, consulte a cruz médico, enfermero o dietista. Puede ayudarlo a " hacer cambios para mejorar cruz cornell.  ¿Puedo consumir los mismos alimentos que mi rasheeda? -- Sí. No hace falta que consuma alimentos especiales si tiene diabetes. Pueden ser los mismos que consume cruz rasheeda. Para modificar cruz dieta, debe consumir principalmente alimentos saludables y evitar comer demasiado.  ¿Cuáles son las otras partes del tratamiento para la diabetes? -- Además de modificar cruz alimentación, las otras partes del tratamiento para la diabetes son las siguientes:  Ejercicio  Medicinas  Algunas personas diabéticas deben aprender a combinar cruz dieta y rutina de ejercicios con cruz dosis de medicina. Por ejemplo, es posible que aquellas personas que usan insulina deban regular la dosis que se van a aplicar. Para hacer esto, deben considerar lo siguiente:  Qué van a comer en la próxima comida  Cuánto ejercicio tienen pensado hacer  Cuál es cruz nivel de azúcar en [place]  Si la dieta y la rutina de ejercicios no coinciden con la dosis de medicina, el nivel de azúcar en [place] puede bajar o subir demasiado y esto puede producir problemas.  Todos los artículos se actualizan a medida que se descubre nueva evidencia y culmina nuestro proceso de evaluación por homólogos   Miya artículo se recuperó de [employer] el: Sep 21, 2021.  Artículo 09413 Versión [id number]  Release: 29.4.2 - C29.263  © 2021 [employer]. Todos los derechos reservados.  Exención de responsabilidad y uso de la información del consumidor   Esta información no es un consejo médico específico ni es un sustituto de la información que le wendy cruz proveedor de atención médica. Es solamente un resumen breve de datos generales. NO incluye toda la información sobre padecimientos, enfermedades, lesiones, pruebas, procedimientos, tratamientos, terapias, instrucciones de bonnie u opciones de estilo de carina que podrían corresponder en cruz thony. Debe hablar con cruz proveedor de atención médica para obtener información completa sobre cruz cornell y jacquie opciones  de tratamiento. Esta información no debe utilizarse para decidir si aceptar o no el consejo, las instrucciones o las recomendaciones de cruz proveedor de atención médica, y solo él posee los conocimientos y la capacitación para darle consejos adecuados para usted.El uso de radha sitio web se rige por los Términos de uso de [employer] ©2021 [employer], Inc. Todos los derechos reservados.  Copyright   © 2021 [employer]. Todos los derechos reservados.

## 2023-05-05 LAB
ALBUMIN SERPL BCP-MCNC: 4.4 G/DL (ref 3.5–5.2)
ALBUMIN/GLOBULIN RATIO: 1.6 (ref 1–2.7)
ALP SERPL-CCNC: 131 U/L (ref 35–105)
ALT SERPL W P-5'-P-CCNC: 20 U/L (ref 0–33)
ANION GAP SERPL CALC-SCNC: 12 MMOL/L (ref 8–17)
AST SERPL-CCNC: 20 U/L (ref 0–32)
BILIRUB SERPL-MCNC: 0.3 MG/DL (ref 0–1.2)
BUN SERPL-MCNC: 11.2 MG/DL (ref 6–20)
BUN/CREAT RATIO: 22 (ref 6–20)
CALCIUM SERPL-MCNC: 9.1 MG/DL (ref 8.6–10.2)
CHLORIDE SERPL-SCNC: 104 MMOL/L (ref 98–107)
CO2 SERPL-SCNC: 24 MMOL/L (ref 22–29)
CREAT SERPL-MCNC: 0.5 MG/DL (ref 0.5–0.9)
GFR ESTIMATION: 91.62
GLOBULIN: 2.7 (ref 1.5–4.5)
GLUCOSE SERPL-MCNC: 321 MG/DL (ref 74–106)
HBA1C MFR BLD: 13.9 % (ref 4–6)
POTASSIUM SERPL-SCNC: 4.6 MMOL/L (ref 3.5–5.1)
PROT SNV-MCNC: 7.1 G/L (ref 6.4–8.3)
SODIUM BLD-SCNC: 140 MMOL/L (ref 136–145)

## 2023-05-10 ENCOUNTER — PATIENT OUTREACH (OUTPATIENT)
Dept: ADMINISTRATIVE | Facility: HOSPITAL | Age: 58
End: 2023-05-10
Payer: MEDICAID

## 2023-05-10 NOTE — PROGRESS NOTES
Working Mammogram Report:     Pt overdue for mammogram. Called to offer scheduling.  Pt missed last mammo, will call to reschedule.

## 2023-05-15 ENCOUNTER — TELEPHONE (OUTPATIENT)
Dept: PRIMARY CARE CLINIC | Facility: CLINIC | Age: 58
End: 2023-05-15
Payer: MEDICAID

## 2023-05-15 NOTE — TELEPHONE ENCOUNTER
Please notify patient her A1C is still elevated at 13.9 and her average glucose is 353 so be sure to take her Diabetes medications every day and eat a low carb, low sugar diet and exercise 3-4 days per week for better blood sugar control.

## 2023-05-17 ENCOUNTER — PATIENT OUTREACH (OUTPATIENT)
Dept: ADMINISTRATIVE | Facility: HOSPITAL | Age: 58
End: 2023-05-17
Payer: MEDICAID

## 2023-05-17 NOTE — PROGRESS NOTES
A1c: tried calling patient. PCP had placed labs for patient to have done at [facility]. Not done yet.

## 2023-05-23 ENCOUNTER — PATIENT OUTREACH (OUTPATIENT)
Dept: ADMINISTRATIVE | Facility: HOSPITAL | Age: 58
End: 2023-05-23
Payer: MEDICAID

## 2023-07-07 ENCOUNTER — PATIENT OUTREACH (OUTPATIENT)
Dept: ADMINISTRATIVE | Facility: HOSPITAL | Age: 58
End: 2023-07-07
Payer: MEDICAID

## 2023-08-04 ENCOUNTER — PATIENT MESSAGE (OUTPATIENT)
Dept: ADMINISTRATIVE | Facility: HOSPITAL | Age: 58
End: 2023-08-04
Payer: MEDICAID

## 2023-11-03 ENCOUNTER — PATIENT MESSAGE (OUTPATIENT)
Dept: ADMINISTRATIVE | Facility: HOSPITAL | Age: 58
End: 2023-11-03
Payer: MEDICAID

## 2023-11-22 DIAGNOSIS — E11.9 TYPE 2 DIABETES MELLITUS WITHOUT COMPLICATION: ICD-10-CM

## 2024-02-02 ENCOUNTER — PATIENT MESSAGE (OUTPATIENT)
Dept: ADMINISTRATIVE | Facility: HOSPITAL | Age: 59
End: 2024-02-02
Payer: MEDICAID

## 2024-02-06 DIAGNOSIS — Z12.11 COLON CANCER SCREENING: ICD-10-CM

## 2024-02-21 DIAGNOSIS — E11.9 TYPE 2 DIABETES MELLITUS WITHOUT COMPLICATION, WITHOUT LONG-TERM CURRENT USE OF INSULIN: Chronic | ICD-10-CM

## 2024-02-21 RX ORDER — SITAGLIPTIN 50 MG/1
50 TABLET, FILM COATED ORAL
Qty: 90 TABLET | Refills: 1 | Status: SHIPPED | OUTPATIENT
Start: 2024-02-21

## 2024-05-03 ENCOUNTER — PATIENT MESSAGE (OUTPATIENT)
Dept: ADMINISTRATIVE | Facility: HOSPITAL | Age: 59
End: 2024-05-03
Payer: MEDICAID

## 2024-06-25 ENCOUNTER — OFFICE VISIT (OUTPATIENT)
Dept: PRIMARY CARE CLINIC | Facility: CLINIC | Age: 59
End: 2024-06-25
Payer: MEDICAID

## 2024-06-25 VITALS
BODY MASS INDEX: 23.92 KG/M2 | HEART RATE: 79 BPM | DIASTOLIC BLOOD PRESSURE: 77 MMHG | SYSTOLIC BLOOD PRESSURE: 121 MMHG | TEMPERATURE: 98 F | OXYGEN SATURATION: 98 % | HEIGHT: 63 IN | WEIGHT: 135 LBS | RESPIRATION RATE: 18 BRPM

## 2024-06-25 DIAGNOSIS — E78.2 MIXED HYPERLIPIDEMIA: ICD-10-CM

## 2024-06-25 DIAGNOSIS — Z12.11 SCREENING FOR COLORECTAL CANCER: ICD-10-CM

## 2024-06-25 DIAGNOSIS — R35.0 URINE FREQUENCY: ICD-10-CM

## 2024-06-25 DIAGNOSIS — Z12.31 BREAST CANCER SCREENING BY MAMMOGRAM: ICD-10-CM

## 2024-06-25 DIAGNOSIS — B37.31 VAGINAL YEAST INFECTION: ICD-10-CM

## 2024-06-25 DIAGNOSIS — E55.9 VITAMIN D DEFICIENCY: ICD-10-CM

## 2024-06-25 DIAGNOSIS — Z12.12 SCREENING FOR COLORECTAL CANCER: ICD-10-CM

## 2024-06-25 DIAGNOSIS — Z13.29 THYROID DISORDER SCREEN: ICD-10-CM

## 2024-06-25 DIAGNOSIS — Z00.00 ANNUAL PHYSICAL EXAM: Primary | ICD-10-CM

## 2024-06-25 DIAGNOSIS — E11.9 TYPE 2 DIABETES MELLITUS WITHOUT COMPLICATION, WITHOUT LONG-TERM CURRENT USE OF INSULIN: ICD-10-CM

## 2024-06-25 PROBLEM — E78.00 PURE HYPERCHOLESTEROLEMIA: Status: RESOLVED | Noted: 2021-07-22 | Resolved: 2024-06-25

## 2024-06-25 LAB — HBA1C MFR BLD: 14.8 %

## 2024-06-25 PROCEDURE — 3046F HEMOGLOBIN A1C LEVEL >9.0%: CPT | Mod: CPTII,S$GLB,, | Performed by: NURSE PRACTITIONER

## 2024-06-25 PROCEDURE — 3074F SYST BP LT 130 MM HG: CPT | Mod: CPTII,S$GLB,, | Performed by: NURSE PRACTITIONER

## 2024-06-25 PROCEDURE — 3008F BODY MASS INDEX DOCD: CPT | Mod: CPTII,S$GLB,, | Performed by: NURSE PRACTITIONER

## 2024-06-25 PROCEDURE — 99396 PREV VISIT EST AGE 40-64: CPT | Mod: S$GLB,,, | Performed by: NURSE PRACTITIONER

## 2024-06-25 PROCEDURE — 1160F RVW MEDS BY RX/DR IN RCRD: CPT | Mod: CPTII,S$GLB,, | Performed by: NURSE PRACTITIONER

## 2024-06-25 PROCEDURE — 83036 HEMOGLOBIN GLYCOSYLATED A1C: CPT | Mod: QW,,, | Performed by: NURSE PRACTITIONER

## 2024-06-25 PROCEDURE — 1159F MED LIST DOCD IN RCRD: CPT | Mod: CPTII,S$GLB,, | Performed by: NURSE PRACTITIONER

## 2024-06-25 PROCEDURE — 3078F DIAST BP <80 MM HG: CPT | Mod: CPTII,S$GLB,, | Performed by: NURSE PRACTITIONER

## 2024-06-25 RX ORDER — DICLOFENAC SODIUM 75 MG/1
TABLET, DELAYED RELEASE ORAL
COMMUNITY
Start: 2024-04-26 | End: 2024-06-25

## 2024-06-25 RX ORDER — ROSUVASTATIN CALCIUM 5 MG/1
5 TABLET, COATED ORAL DAILY
Qty: 90 TABLET | Refills: 3 | Status: SHIPPED | OUTPATIENT
Start: 2024-06-25

## 2024-06-25 RX ORDER — METFORMIN HYDROCHLORIDE 500 MG/1
500 TABLET ORAL 2 TIMES DAILY WITH MEALS
Qty: 60 TABLET | Refills: 5 | Status: SHIPPED | OUTPATIENT
Start: 2024-06-25

## 2024-06-25 RX ORDER — TRAMADOL HYDROCHLORIDE 50 MG/1
TABLET ORAL
COMMUNITY
Start: 2024-04-26 | End: 2024-06-25

## 2024-06-25 RX ORDER — FLUCONAZOLE 150 MG/1
150 TABLET ORAL DAILY
Qty: 2 TABLET | Refills: 0 | Status: SHIPPED | OUTPATIENT
Start: 2024-06-25 | End: 2024-06-27

## 2024-06-25 NOTE — PATIENT INSTRUCTIONS
"RTC in 2 months for F/U or sooner if needed.    Patient Education       Diabetes y dieta   Conceptos Básicos   Redactado por los médicos y editores de UpToDate   ¿Por qué la dieta es importante cuando se tiene diabetes? -- La dieta es importante porque es parte del tratamiento de la diabetes. Para ayudar a tratar la diabetes, muchas personas tienen que modificar lo que comen y la cantidad que comen. Es importante que las personas traten la diabetes para que puedan:  Mantener cruz azúcar en nat[place] en un nivel normal o cerca de radha  Prevenir problemas a lili plazo, shahid por ejemplo problemas renales, que pueden aparecer en las personas con diabetes  La modificación de la dieta también puede ayudar a tratar la obesidad, la presión arterial bonnie y el colesterol alto. Estos padecimientos pueden afectar a las personas con diabetes y provocar problemas en el futuro, shahid infartos o accidentes cerebrovasculares (derrames).  ¿Quién trabajará conmigo para modificar mi dieta? -- Cruz médico o enfermero trabajará con usted para crear un plan de alimentación y modificar cruz dieta. También es posible que le recomiende que trabaje con un "dietista". Un dietista es un experto en alimentos y alimentación.  ¿Adela comer a las mismas horas todos los días? -- El momento y la frecuencia para alimentarse dependen, en parte, de las medicinas para la diabetes que use. Por ejemplo:  Las personas que usan aproximadamente la misma cantidad de insulina a la misma hora todos los días (lo que se llama "régimen fijo") deben comer siempre a la misma hora. [place] también se aplica para las personas que christopher píldoras que aumentan los niveles de insulina, shahid las sulfonilureas. [place] a la misma hora todos los días ayuda a prevenir niveles bajos de azúcar en nat[place].  Las personas que ajustan diariamente cruz dosis de insulina y a qué hora la usan (lo que se llama "régimen flexible") no siempre tienen que comer a la misma hora. Eso se debe a que pueden " "programar cruz dosis de insulina para antes de cuando tengan planeado comer, y también ajustar la dosis en función de cuánto planean comer.  Las personas que usan medicinas que no suelen causar niveles bajos de azúcar en nat[place], shahid la metformina, no necesitan comer a la misma hora todos los días.  ¿Qué hosea tener en cuenta al planificar lo que comeré? -- El organismo convierte los alimentos que consumimos en partes pequeñas llamadas carbohidratos, proteínas y grasas.  Al planificar lo que comerán, las personas con diabetes deben tener en cuenta lo siguiente:  Carbohidratos - Los carbohidratos, que son azúcares que el organismo utiliza para generar energía, pueden elevar el nivel de azúcar en nat[place]. Cruz médico, enfermero o dietista le indicará cuántos carbohidratos debe consumir en cada comida o refrigerio. Entre los alimentos que contienen carbohidratos se incluyen:  Pan, pasta y arroz  Verduras y frutas  Lácteos  Alimentos y bebidas con azúcar agregada  Es mejor obtener los carbohidratos de las frutas, las verduras, los granos enteros y la leche descremada. Lo mejor es evitar las bebidas con azúcar agregada, shahid las gaseosas, los jugos y las bebidas deportivas.   Proteína - El médico, enfermero o dietista le dirá cuánta proteína debe consumir a diario. Lo más conveniente es consumir roxi magras, pescado, huevos, frijoles, arvejas, productos derivados de la soja, jarvis secos y semillas.  Grasas - El tipo de grasa que se consume es más importante que la cantidad de grasa consumida. Las grasas "saturadas" y "trans" pueden aumentar el riesgo de padecer problemas del corazón, shahid por ejemplo un infarto.  Entre los alimentos que tienen grasas saturadas se encuentran la carne, la manteca, el queso y el helado.  Entre los alimentos que tienen grasas trans se encuentran los alimentos procesados que incluyen "aceites parcialmente hidrogenados" en la lista de ingredientes, shahid los alimentos fritos y las galletas, los " "panecillos, los pasteles y los bizcochos que se compran en las tiendas.  Las grasas "monoinsaturadas" y "poliinsaturadas" son más saludables. Entre los alimentos que las incluyen se cuentan el pescado, el aguacate, el aceite de martell y los jarvis secos.  Calorías - Las personas deben consumir skye cantidad determinada de calorías por día para mantener cruz peso. Las que tienen sobrepeso y aruna bajar de peso, deben consumir menos calorías por día.  Fibra - Consumir alimentos con mucha fibra puede ayudar a controlar el nivel de azúcar en nat[place]. Algunos alimentos que contienen mucha fibra son las manzanas, las habichuelas, las arvejas, los frijoles, las lentejas, los jarvis secos, el cereal de phi y los granos integrales.  Diego - Las personas que tienen presión arterial bonnie no deben consumir alimentos que contengan mucha sal (también llamada sodio). También deben consumir alimentos saludables, shahid frutas, verduras y lácteos bajos en grasa.  Alcohol - Beber más de skye copa (en el thony de las mujeres) o dos (en el thony de los hombres) por día puede aumentar los niveles de azúcar en nat[place]. Además, las bebidas que tienen jugo de frutas o las gaseosas pueden aumentar los niveles de azúcar en nat[place].  ¿Qué puedo hacer si necesito bajar de peso? -- Si necesita bajar de peso, puede hacer lo siguiente:  Ejercicio - Intente hacer al menos 30 minutos de actividad física por día, la mayoría de los días de la semana. Incluso el ejercicio suave, shahid caminar, es quesada para la cornell. Algunas personas que padecen diabetes deben modificar cruz dosis de medicina antes de hacer ejercicio y también es posible que deban revisar cruz nivel de azúcar en nat[place] antes y después de hacer ejercicio.  Consumir menos calorías - Cruz médico, enfermero o dietista puede indicarle cuántas calorías debe consumir por día para bajar de peso.  Si está preocupado por cruz peso, tamaño o silueta, consulte a cruz médico, enfermero o dietista. Puede ayudarlo a " hacer cambios para mejorar cruz cornell.  ¿Puedo consumir los mismos alimentos que mi rasheeda? -- Sí. No hace falta que consuma alimentos especiales si tiene diabetes. Pueden ser los mismos que consume crzu rasheeda. Para modificar cruz dieta, debe consumir principalmente alimentos saludables y evitar comer demasiado.  ¿Cuáles son las otras partes del tratamiento para la diabetes? -- Además de modificar cruz alimentación, las otras partes del tratamiento para la diabetes son las siguientes:  Ejercicio  Medicinas  Algunas personas diabéticas deben aprender a combinar cruz dieta y rutina de ejercicios con cruz dosis de medicina. Por ejemplo, es posible que aquellas personas que usan insulina deban regular la dosis que se van a aplicar. Para hacer esto, deben considerar lo siguiente:  Qué van a comer en la próxima comida  Cuánto ejercicio tienen pensado hacer  Cuál es cruz nivel de azúcar en nat[place]  Si la dieta y la rutina de ejercicios no coinciden con la dosis de medicina, el nivel de azúcar en nat[place] puede bajar o subir demasiado y esto puede producir problemas.  Todos los artículos se actualizan a medida que se descubre nueva evidencia y culmina nuestro proceso de evaluación por homólogos   Miya artículo se recuperó de UpToDate el: Sep 21, 2021.  Artículo 73953 Versión [id number]  Release: 29.4.2 - C29.263  © 2021 UpToDate, Inc. Todos los derechos reservados.  Exención de responsabilidad y uso de la información del consumidor   Esta información no es un consejo médico específico ni es un sustituto de la información que le wendy cruz proveedor de atención médica. Es solamente un resumen breve de datos generales. NO incluye toda la información sobre padecimientos, enfermedades, lesiones, pruebas, procedimientos, tratamientos, terapias, instrucciones de bonnie u opciones de estilo de carina que podrían corresponder en cruz thony. Debe hablar con cruz proveedor de atención médica para obtener información completa sobre cruz cornell y jacquie opciones  de tratamiento. Esta información no debe utilizarse para decidir si aceptar o no el consejo, las instrucciones o las recomendaciones de cruz proveedor de atención médica, y solo él posee los conocimientos y la capacitación para darle consejos adecuados para usted.El uso de radha sitio web se rige por los Términos de uso de UpToDate ©2021 [employer]. Todos los derechos reservados.  Copyright   © 2021 [employer]. Todos los derechos reservados.     Patient Education       Instrucciones de bonnie hospitalaria en thony de colesterol elevado   Acerca de radha shobha   El colesterol es skye materia cerosa en la nat[place]. El hígado hace el colesterol para el cuerpo. También incorpora colesterol a través de los alimentos que ingiere.   Existen mayco tipos de colesterol:  Lipoproteína de baja densidad (LDL): radha es el «colesterol carmen». La LDL hace que el colesterol y otras materias se acumulen y tapen las arterias.  Lipoproteína de bonnie densidad (HDL); radha es el «colesterol quesada». La HDL desprende las materias de jacquie arterias.  Triglicéridos: son un tipo de grasa en la nat[place].  Cruz cuerpo necesita colesterol para funcionar de manera correcta. Sin embargo, demasiado colesterol es carmen para la cornell. El colesterol puede tapar las arterias y, por ende, disminuir el flujo de [place] en el cuerpo. [place] puede llevar a problemas de cornell graves shahid accidente cerebrovascular, enfermedad cardíaca y ataque cardíaco. Puede controlar los niveles altos de colesterol con medicamentos y cambios en el estilo de carina.       ¿Qué cuidados se necesitan en casa?   Pregúntele a cruz médico qué debe hacer al irse a casa. Asegúrese de hacer preguntas si no entiende lo que el médico explica. Así sabrá qué debe hacer.  Si fuma, deje de hacerlo.   Si tiene mucho sobrepeso, baje de peso.   Mantenga la presión arterial y el azúcar en la nat[place] en niveles saludables.  ¿Qué cuidados se necesitan en la etapa de seguimiento?   Es posible que el médico le solicite  que visite el consultorio para evaluar cruz progreso. No falte a estas citas.  ¿Qué medicamentos pueden ser necesarios?   Es posible que el médico le recete medicamentos para conseguir lo siguiente:  Reducir los niveles de LDL  Controlar la presión arterial y el azúcar en la nat[place] si es necesario  ¿Estará restringida la actividad física?   La actividad física regular puede ayudar. Intente hacer alguna actividad que eleve el ritmo cardíaco, shahid caminar, abelardo 30 minutos la mayoría de los días. Hable con el médico acerca de cuál es la cantidad adecuada de ejercicio para usted.  ¿Qué cambios en la dieta son necesarios?   Seguir skye dieta saludable es importante abelardo radha período. Pida ray a un nutriólogo para recibir ayuda con un plan que sea adecuado para usted. En general, comer de forma saludable significa lo siguiente:  [place] alimentos integrales y con alto contenido de fibras.  Elegir muchas frutas y verduras diferentes. Lo mejor es que tomas frescas o congeladas. Las frutas y verduras enlatadas pueden tener azúcar y guero añadidas.  Reduzca el consumo de grasas sólidas shahid la mantequilla o la margarina. Coma menos alimentos grasosos o procesados. Evite las grasas trans.  Elija alimentos con grasas saludables para el corazón. [place] incluye pescado y mariscos, nueces, semillas y aceites vegetales insaturados shahid canola y martell.  Coma más roxi bajas en grasa o magras shahid justin, pescado, pavo, carne de rocío, carne de res magra y carne de cerdo magra. Consuma menos carne luann. Intente comer proteínas no cárnicas, shahid frijoles, nueces y mantequillas de nueces; semillas o alternativas de carne hechas con soya o proteína vegetal texturizada.  Limitar el consumo de cerveza, vino y bebidas mixtas (alcohol).  Evitar la cafeína.  ¿Qué problemas podrían surgir?   Ataque cardíaco  Enfermedad del corazón  Accidente cerebrovascular  ¿Cómo puede prevenirse radha problema de cornell?   Coma skye dieta saludable para el  corazón con grasas saludables para el corazón, muchas frutas, verduras y granos integrales.  Mantenga un peso saludable.  Berenice ejercicio con regularidad.  ¿Cuándo hosea llamar al médico?   Active el sistema médico de emergencia de inmediato si tiene signos de ataque cardíaco o accidente cerebrovascular. Llame al 911 si se encuentra en [place] o [place]. Mientras más pronto empiece el tratamiento, mayores serán las posibilidades de recuperación. Llame inmediatamente para recibir ayuda de emergencia si presenta:  Signos de un ataque cardíaco:  Dolor de pecho.  Problemas para respirar.  Pulso rápido.  Mareos  Signos de un accidente cerebrovascular:  Entumecimiento o debilidad repentina en la luis enrique, brazos o piernas, en especial de un lado del cuerpo  Confusión repentina, problemas para hablar o de comprensión  Problemas repentinos para ray en kamila o los dos ojos  Problemas repentinos para caminar, mareos o pérdida del equilibrio o la coordinación.  Dolor de ty repentino grave sin causa aparente  Repita la enseñanza con jacquie propias palabras ([place]): Ayudándolo a comprender   El método de enseñanza recíproca ayuda a comprender la información que se le está proporcionando. La idea es simple. Después de hablar con el personal, cuente con jacquie propias palabras lo que se le acaba de comunicar. [place] le asegura al personal que se driver cubierto todos los aspectos necesarios de forma lokesh. También ayuda a explicar ciertas cosas que podrían keena sido un poco confusas. Antes de irse a cruz casa, asegúrese de poder llevar a cabo lo siguiente:  Hablar sobre mi afección.  Decir cuáles son los cambios que hosea realizar con respecto a mi dieta.  Decir qué haré en thony de tener síntomas de un ataque cardíaco o accidente cerebrovascular.   ¿Dónde puedo obtener más información?   American Academy of Family Physicians  http://familydoctor.org/familydoctor/en/diseases-conditions/high-cholesterol.printerview.all.html   American  Heart Association  http://www.heart.org/HEARTORG/Conditions/Cholesterol/PreventionTreatmentofHighCholesterol/Prevention-and-Treatment-of-High-Cholesterol_[facility]_001215_Article.jsp   National Heart Lung and Blood [place]  http://www.nhlbi.nih.gov/health/health-topics/topics/hbc/   National [place] of Health ? Senior Health  http://[facility].gov/highbloodcholesterol/whatishighbloodcholesterol/01.html   Exención de responsabilidad y uso de la información del consumidor   Esta información no constituye asesoramiento médico específico y no reemplaza la información que usted recibe de cruz proveedor de atención médica. Miya es tan solo un resumen de la información general. NO incluye la información completa acerca de afecciones, enfermedades, lesiones, pruebas, procedimientos, tratamientos, terapias, instrucciones para el bonnie o estilos de carina que apliquen en cruz thony. Debe hablar con el proveedor de atención médica para obtener información completa sobre cruz cornell y las opciones de tratamiento. No se debe utilizar esta información para decidir si acepta o no el consejo, instrucciones o recomendaciones del proveedor de atención médica. Solamente el proveedor de atención médica cuenta con los conocimientos y la capacitación para brindarle el mejor consejo.  Copyright   Copyright © 2021 [employer] y jacquie licenciantes y/o afiliados. Todos los derechos reservados.

## 2024-06-25 NOTE — PROGRESS NOTES
Subjective:       Patient ID: Shelby Walls is a 58 y.o. female.    Chief Complaint: Annual Exam    HPI: Shelby is a 58 y.o. female who presents for annual visit.     Mrs. Walls has not been to see me in over a year and her A1C is 14.8 when checked today during visit.    Mrs. Walls c/o feeling fatigued along with polyuria, polydipsia and polyphagia. Also with feet pain and numbness.    Patient's last glucose on last labs were 319 and A1C was >15.0 so she was started back on metformin along with the Januvia which she admits today she has not been taking everyday as prescribed. Also instructed to eat a diabetic diet. Spent some time discussing her diabetes medication and diet and she states understanding.     HLD - has been controlled with crestor. Last LDL was 85 on her last labs last year. She is adherent to a low cholesterol diet but does not exercise regularly.    She has not had a PAP smear in several years and she refuses to see a GYN provider right now.    Due for mammogram and colonoscopy. Mammogram and colonoscopy previously ordered but she missed both appts. Will order both again today. Patient encouraged to call Dr. Arredondo office to schedule her Colonoscopy since she missed her last appt.    Denies smoking or drinking.              Past Medical History:   Diagnosis Date    Diabetes mellitus, type 2        Past Surgical History:   Procedure Laterality Date    APPENDECTOMY       SECTION         Family History   Problem Relation Name Age of Onset    Diabetes Mother         Social History     Tobacco Use    Smoking status: Never    Smokeless tobacco: Never   Substance Use Topics    Alcohol use: Not Currently    Drug use: Never       Patient Active Problem List   Diagnosis    Type 2 diabetes mellitus without complication, without long-term current use of insulin    Genital herpes simplex    Mixed hyperlipidemia       Immunization History   Administered Date(s) Administered    COVID-19, MRNA,  "LN-S, PF (Pfizer) (Purple Cap) 09/03/2021, 09/27/2021    Influenza - Trivalent (ADULT) 10/25/2007    MMR 04/27/2011    Td (ADULT) 01/12/2006    Tdap 01/12/2006, 04/27/2011    Varicella 04/27/2011           Review of Systems   Constitutional:  Positive for fatigue. Negative for activity change, appetite change, chills, diaphoresis and fever.   HENT:  Negative for congestion, ear pain, sinus pain, tinnitus and trouble swallowing.    Eyes:  Negative for pain and visual disturbance.   Respiratory:  Negative for cough, chest tightness, shortness of breath and wheezing.    Cardiovascular:  Negative for chest pain, palpitations and leg swelling.   Gastrointestinal:  Negative for abdominal distention, abdominal pain, blood in stool, constipation, diarrhea, nausea and vomiting.   Endocrine: Positive for polydipsia, polyphagia and polyuria. Negative for cold intolerance and heat intolerance.   Genitourinary:  Negative for decreased urine volume, dysuria, frequency, hematuria and urgency.   Musculoskeletal:  Negative for back pain, gait problem and neck pain.   Skin:  Negative for color change and rash.   Neurological:  Positive for numbness (feet). Negative for dizziness, syncope, weakness, light-headedness and headaches.   Hematological:  Negative for adenopathy. Does not bruise/bleed easily.   Psychiatric/Behavioral:  Negative for behavioral problems, confusion and dysphoric mood. The patient is not nervous/anxious.      Objective:     Vitals:    06/25/24 1508   BP: 121/77   BP Location: Left arm   Patient Position: Sitting   BP Method: Small (Automatic)   Pulse: 79   Resp: 18   Temp: 98 °F (36.7 °C)   TempSrc: Oral   SpO2: 98%   Weight: 61.2 kg (135 lb)   Height: 5' 3" (1.6 m)       Physical Exam  Vitals and nursing note reviewed.   Constitutional:       General: She is not in acute distress.     Appearance: Normal appearance. She is not diaphoretic.   HENT:      Head: Normocephalic and atraumatic.      Nose: Nose normal. "      Mouth/Throat:      Mouth: Mucous membranes are moist.      Pharynx: Oropharynx is clear.   Eyes:      General:         Right eye: No discharge.         Left eye: No discharge.      Extraocular Movements: Extraocular movements intact.      Conjunctiva/sclera: Conjunctivae normal.      Pupils: Pupils are equal, round, and reactive to light.   Neck:      Thyroid: No thyromegaly or thyroid tenderness.   Cardiovascular:      Rate and Rhythm: Normal rate and regular rhythm.      Pulses: Normal pulses.      Heart sounds: Normal heart sounds.   Pulmonary:      Effort: Pulmonary effort is normal.      Breath sounds: Normal breath sounds. No stridor. No wheezing or rales.   Abdominal:      General: Bowel sounds are normal. There is no distension.      Palpations: Abdomen is soft.      Tenderness: There is no abdominal tenderness.   Musculoskeletal:         General: Tenderness (feet) present. Normal range of motion.      Cervical back: Normal range of motion and neck supple.      Right lower leg: No edema.      Left lower leg: No edema.   Lymphadenopathy:      Cervical: No cervical adenopathy.   Skin:     General: Skin is warm and dry.      Capillary Refill: Capillary refill takes less than 2 seconds.      Findings: No rash.   Neurological:      General: No focal deficit present.      Mental Status: She is alert and oriented to person, place, and time.      Cranial Nerves: Cranial nerves 2-12 are intact.      Sensory: Sensation is intact.      Motor: Motor function is intact.      Coordination: Coordination is intact.      Gait: Gait is intact.      Deep Tendon Reflexes: Reflexes are normal and symmetric.   Psychiatric:         Mood and Affect: Mood and affect normal.         Speech: Speech normal.         Behavior: Behavior normal. Behavior is cooperative.         Thought Content: Thought content normal.         Cognition and Memory: Cognition and memory normal.         Judgment: Judgment normal.         Office Visit on  06/25/2024   Component Date Value Ref Range Status    Hemoglobin A1C, POC 06/25/2024 14.8  % Final         Assessment:      1. Annual physical exam    2. Type 2 diabetes mellitus without complication, without long-term current use of insulin    3. Mixed hyperlipidemia    4. Vaginal yeast infection    5. Screening for colorectal cancer    6. Breast cancer screening by mammogram    7. Thyroid disorder screen    8. Urine frequency    9. Vitamin D deficiency          Plan:     Annual physical exam  -     CBC Auto Differential; Future; Expected date: 06/25/2024  -     Comprehensive Metabolic Panel; Future; Expected date: 06/25/2024  -     Lipid Panel; Future; Expected date: 06/25/2024  -     TSH; Future; Expected date: 06/25/2024    Type 2 diabetes mellitus without complication, without long-term current use of insulin  -     POCT HEMOGLOBIN A1C  -     metFORMIN (GLUCOPHAGE) 500 MG tablet; Take 1 tablet (500 mg total) by mouth 2 (two) times daily with meals.  Dispense: 60 tablet; Refill: 5  -     Comprehensive Metabolic Panel; Future; Expected date: 06/25/2024  -     Microalbumin/Creatinine Ratio, Urine; Future; Expected date: 06/25/2024    Mixed hyperlipidemia  -     Lipid Panel; Future; Expected date: 06/25/2024  -     rosuvastatin (CRESTOR) 5 MG tablet; Take 1 tablet (5 mg total) by mouth once daily.  Dispense: 90 tablet; Refill: 3    Vaginal yeast infection  -     fluconazole (DIFLUCAN) 150 MG Tab; Take 1 tablet (150 mg total) by mouth once daily. for 2 days  Dispense: 2 tablet; Refill: 0    Screening for colorectal cancer  -     Ambulatory referral/consult to General Surgery; Future; Expected date: 07/02/2024    Breast cancer screening by mammogram  -     Mammo Digital Screening Bilat; Future; Expected date: 06/25/2024    Thyroid disorder screen  -     T4, Free; Future; Expected date: 06/25/2024  -     TSH; Future; Expected date: 06/25/2024    Urine frequency  -     Urinalysis, Reflex to Urine Culture Urine, Clean  Catch; Future; Expected date: 06/25/2024    Vitamin D deficiency  -     Vitamin D; Future; Expected date: 06/25/2024         Current Outpatient Medications   Medication Sig Dispense Refill    blood sugar diagnostic Strp To check BG twice daily, to use with insurance preferred meter 200 strip 2    JANUVIA 50 mg Tab TAKE ONE TABLET BY MOUTH DAILY 90 tablet 1    lancets Misc To check BG twice daily, to use with insurance preferred meter 200 each 2    blood-glucose meter kit To check BG twice daily, to use with insurance preferred meter. 1 each 0    metFORMIN (GLUCOPHAGE) 500 MG tablet Take 1 tablet (500 mg total) by mouth 2 (two) times daily with meals. 60 tablet 5    rosuvastatin (CRESTOR) 5 MG tablet Take 1 tablet (5 mg total) by mouth once daily. 90 tablet 3     No current facility-administered medications for this visit.       Medications Discontinued During This Encounter   Medication Reason    SUTAB 1.479-0.188- 0.225 gram tablet Patient no longer taking    traMADoL (ULTRAM) 50 mg tablet Patient no longer taking    diclofenac (VOLTAREN) 75 MG EC tablet Patient no longer taking    metFORMIN (GLUCOPHAGE) 500 MG tablet Reorder    rosuvastatin (CRESTOR) 5 MG tablet Reorder       Health Maintenance   Topic Date Due    Eye Exam  Never done    Mammogram  Never done    Colorectal Cancer Screening  Never done    Shingles Vaccine (1 of 2) Never done    TETANUS VACCINE  04/27/2021    Foot Exam  10/25/2023    Lipid Panel  10/26/2023    Hemoglobin A1c  09/25/2024    Low Dose Statin  06/25/2025    Hepatitis C Screening  Completed       Patient Instructions   RTC in 2 months for F/U or sooner if needed.    Patient Education       Diabetes y dieta   Conceptos Básicos   Redactado por los médicos y editores de UpToDate   ¿Por qué la dieta es importante cuando se tiene diabetes? -- La dieta es importante porque es parte del tratamiento de la diabetes. Para ayudar a tratar la diabetes, muchas personas tienen que modificar lo que  "comen y la cantidad que comen. Es importante que las personas traten la diabetes para que puedan:  Mantener cruz azúcar en nat[place] en un nivel normal o cerca de radha  Prevenir problemas a lili plazo, shahid por ejemplo problemas renales, que pueden aparecer en las personas con diabetes  La modificación de la dieta también puede ayudar a tratar la obesidad, la presión arterial bonnie y el colesterol alto. Estos padecimientos pueden afectar a las personas con diabetes y provocar problemas en el futuro, shahid infartos o accidentes cerebrovasculares (derrames).  ¿Quién trabajará conmigo para modificar mi dieta? -- Cruz médico o enfermero trabajará con usted para crear un plan de alimentación y modificar cruz dieta. También es posible que le recomiende que trabaje con un "dietista". Un dietista es un experto en alimentos y alimentación.  ¿Adela comer a las mismas horas todos los días? -- El momento y la frecuencia para alimentarse dependen, en parte, de las medicinas para la diabetes que use. Por ejemplo:  Las personas que usan aproximadamente la misma cantidad de insulina a la misma hora todos los días (lo que se llama "régimen fijo") deben comer siempre a la misma hora. [place] también se aplica para las personas que christopher píldoras que aumentan los niveles de insulina, shahid las sulfonilureas. [place] a la misma hora todos los días ayuda a prevenir niveles bajos de azúcar en nat[place].  Las personas que ajustan diariamente cruz dosis de insulina y a qué hora la usan (lo que se llama "régimen flexible") no siempre tienen que comer a la misma hora. Eso se debe a que pueden programar cruz dosis de insulina para antes de cuando tengan planeado comer, y también ajustar la dosis en función de cuánto planean comer.  Las personas que usan medicinas que no suelen causar niveles bajos de azúcar en nat[place], shahid la metformina, no necesitan comer a la misma hora todos los días.  ¿Qué adela tener en cuenta al planificar lo que comeré? -- El organismo " "convierte los alimentos que consumimos en partes pequeñas llamadas carbohidratos, proteínas y grasas.  Al planificar lo que comerán, las personas con diabetes deben tener en cuenta lo siguiente:  Carbohidratos - Los carbohidratos, que son azúcares que el organismo utiliza para generar energía, pueden elevar el nivel de azúcar en nat[place]. Cruz médico, enfermero o dietista le indicará cuántos carbohidratos debe consumir en cada comida o refrigerio. Entre los alimentos que contienen carbohidratos se incluyen:  Pan, pasta y arroz  Verduras y frutas  Lácteos  Alimentos y bebidas con azúcar agregada  Es mejor obtener los carbohidratos de las frutas, las verduras, los granos enteros y la leche descremada. Lo mejor es evitar las bebidas con azúcar agregada, shahid las gaseosas, los jugos y las bebidas deportivas.   Proteína - El médico, enfermero o dietista le dirá cuánta proteína debe consumir a diario. Lo más conveniente es consumir roxi magras, pescado, huevos, frijoles, arvejas, productos derivados de la soja, jarvis secos y semillas.  Grasas - El tipo de grasa que se consume es más importante que la cantidad de grasa consumida. Las grasas "saturadas" y "trans" pueden aumentar el riesgo de padecer problemas del corazón, shahid por ejemplo un infarto.  Entre los alimentos que tienen grasas saturadas se encuentran la carne, la manteca, el queso y el helado.  Entre los alimentos que tienen grasas trans se encuentran los alimentos procesados que incluyen "aceites parcialmente hidrogenados" en la lista de ingredientes, shahid los alimentos fritos y las galletas, los panecillos, los pasteles y los bizcochos que se compran en las tiendas.  Las grasas "monoinsaturadas" y "poliinsaturadas" son más saludables. Entre los alimentos que las incluyen se cuentan el pescado, el aguacate, el aceite de martell y los jarvis secos.  Calorías - Las personas deben consumir skye cantidad determinada de calorías por día para mantener cruz peso. Las que " tienen sobrepeso y aruna bajar de peso, deben consumir menos calorías por día.  Fibra - Consumir alimentos con mucha fibra puede ayudar a controlar el nivel de azúcar en nat[place]. Algunos alimentos que contienen mucha fibra son las manzanas, las habichuelas, las arvejas, los frijoles, las lentejas, los jarvis secos, el cereal de phi y los granos integrales.  Diego - Las personas que tienen presión arterial bonnie no deben consumir alimentos que contengan mucha sal (también llamada sodio). También deben consumir alimentos saludables, shahid frutas, verduras y lácteos bajos en grasa.  Alcohol - Beber más de skye copa (en el thony de las mujeres) o dos (en el thony de los hombres) por día puede aumentar los niveles de azúcar en nat[place]. Además, las bebidas que tienen jugo de frutas o las gaseosas pueden aumentar los niveles de azúcar en natalia.  ¿Qué puedo hacer si necesito bajar de peso? -- Si necesita bajar de peso, puede hacer lo siguiente:  Ejercicio - Intente hacer al menos 30 minutos de actividad física por día, la mayoría de los días de la semana. Incluso el ejercicio suave, shahid caminar, es quesada para la cornell. Algunas personas que padecen diabetes deben modificar cruz dosis de medicina antes de hacer ejercicio y también es posible que deban revisar cruz nivel de azúcar en nat[place] antes y después de hacer ejercicio.  Consumir menos calorías - Cruz médico, enfermero o dietista puede indicarle cuántas calorías debe consumir por día para bajar de peso.  Si está preocupado por cruz peso, tamaño o silueta, consulte a cruz médico, enfermero o dietista. Puede ayudarlo a hacer cambios para mejorar cruz cornell.  ¿Puedo consumir los mismos alimentos que mi rasheeda? -- Sí. No hace falta que consuma alimentos especiales si tiene diabetes. Pueden ser los mismos que consume cruz rasheeda. Para modificar cruz dieta, debe consumir principalmente alimentos saludables y evitar comer demasiado.  ¿Cuáles son las otras partes del tratamiento para la diabetes?  -- Además de modificar cruz alimentación, las otras partes del tratamiento para la diabetes son las siguientes:  Ejercicio  Medicinas  Algunas personas diabéticas deben aprender a combinar cruz dieta y rutina de ejercicios con cruz dosis de medicina. Por ejemplo, es posible que aquellas personas que usan insulina deban regular la dosis que se van a aplicar. Para hacer esto, deben considerar lo siguiente:  Qué van a comer en la próxima comida  Cuánto ejercicio tienen pensado hacer  Cuál es cruz nivel de azúcar en nat[place]  Si la dieta y la rutina de ejercicios no coinciden con la dosis de medicina, el nivel de azúcar en natalia puede bajar o subir demasiado y esto puede producir problemas.  Todos los artículos se actualizan a medida que se descubre nueva evidencia y culmina nuestro proceso de evaluación por homólogos   Radha artículo se recuperó de UpToDate el: Sep 21, 2021.  Artículo 14175 Versión [id number].[id number]  Release: 29.4.2 - C29.263  © 2021 UpToDate, Inc. Todos los derechos reservados.  Exención de responsabilidad y uso de la información del consumidor   Esta información no es un consejo médico específico ni es un sustituto de la información que le wendy cruz proveedor de atención médica. Es solamente un resumen breve de datos generales. NO incluye toda la información sobre padecimientos, enfermedades, lesiones, pruebas, procedimientos, tratamientos, terapias, instrucciones de bonnie u opciones de estilo de carina que podrían corresponder en cruz thony. Debe hablar con cruz proveedor de atención médica para obtener información completa sobre cruz cornell y jacquie opciones de tratamiento. Esta información no debe utilizarse para decidir si aceptar o no el consejo, las instrucciones o las recomendaciones de cruz proveedor de atención médica, y solo él posee los conocimientos y la capacitación para darle consejos adecuados para usted.El uso de radha sitio web se rige por los Términos de uso de UpToDate ©2021 UpToDate, Inc. Todos los derechos  reservados.  Copyright   © 2021 UpToDate, Inc. Todos los derechos reservados.     Patient Education       Instrucciones de bonnie hospitalaria en thony de colesterol elevado   Acerca de radha shobha   El colesterol es skye materia cerosa en la natalia. El hígado hace el colesterol para el cuerpo. También incorpora colesterol a través de los alimentos que ingiere.   Existen mayco tipos de colesterol:  Lipoproteína de baja densidad (LDL): radha es el «colesterol carmen». La LDL hace que el colesterol y otras materias se acumulen y tapen las arterias.  Lipoproteína de bonnie densidad (HDL); radha es el «colesterol quesada». La HDL desprende las materias de jacquie arterias.  Triglicéridos: son un tipo de grasa en la natalia.  Cruz cuerpo necesita colesterol para funcionar de manera correcta. Sin embargo, demasiado colesterol es carmen para la cornell. El colesterol puede tapar las arterias y, por ende, disminuir el flujo de nat[place] en el cuerpo. [place] puede llevar a problemas de cornell graves shahid accidente cerebrovascular, enfermedad cardíaca y ataque cardíaco. Puede controlar los niveles altos de colesterol con medicamentos y cambios en el estilo de carina.       ¿Qué cuidados se necesitan en casa?   Pregúntele a cruz médico qué debe hacer al irse a casa. Asegúrese de hacer preguntas si no entiende lo que el médico explica. Así sabrá qué debe hacer.  Si fuma, deje de hacerlo.   Si tiene mucho sobrepeso, baje de peso.   Mantenga la presión arterial y el azúcar en la natalia en niveles saludables.  ¿Qué cuidados se necesitan en la etapa de seguimiento?   Es posible que el médico le solicite que visite el consultorio para evaluar cruz progreso. No falte a estas citas.  ¿Qué medicamentos pueden ser necesarios?   Es posible que el médico le recete medicamentos para conseguir lo siguiente:  Reducir los niveles de LDL  Controlar la presión arterial y el azúcar en la natalia si es necesario  ¿Estará restringida la actividad física?   La actividad física regular  puede ayudar. Intente hacer alguna actividad que eleve el ritmo cardíaco, shahid caminar, abelardo 30 minutos la mayoría de los días. Hable con el médico acerca de cuál es la cantidad adecuada de ejercicio para usted.  ¿Qué cambios en la dieta son necesarios?   Seguir skye dieta saludable es importante abelardo radha período. Pida ray a un nutriólogo para recibir ayuda con un plan que sea adecuado para usted. En general, comer de forma saludable significa lo siguiente:  Hume alimentos integrales y con alto contenido de fibras.  Elegir muchas frutas y verduras diferentes. Lo mejor es que tomas frescas o congeladas. Las frutas y verduras enlatadas pueden tener azúcar y guero añadidas.  Reduzca el consumo de grasas sólidas shahid la mantequilla o la margarina. Coma menos alimentos grasosos o procesados. Evite las grasas trans.  Elija alimentos con grasas saludables para el corazón. [place] incluye pescado y mariscos, nueces, semillas y aceites vegetales insaturados shahid canola y martell.  Coma más roxi bajas en grasa o magras shahid justin, pescado, pavo, carne de rocío, carne de res magra y carne de cerdo magra. Consuma menos carne luann. Intente comer proteínas no cárnicas, shahid frijoles, nueces y mantequillas de nueces; semillas o alternativas de carne hechas con soya o proteína vegetal texturizada.  Limitar el consumo de cerveza, vino y bebidas mixtas (alcohol).  Evitar la cafeína.  ¿Qué problemas podrían surgir?   Ataque cardíaco  Enfermedad del corazón  Accidente cerebrovascular  ¿Cómo puede prevenirse rdaha problema de cornell?   Coma skye dieta saludable para el corazón con grasas saludables para el corazón, muchas frutas, verduras y granos integrales.  Mantenga un peso saludable.  Berenice ejercicio con regularidad.  ¿Cuándo hosea llamar al médico?   Active el sistema médico de emergencia de inmediato si tiene signos de ataque cardíaco o accidente cerebrovascular. Llame al 911 si se encuentra en [place] o [place]. Mientras más  pronto empiece el tratamiento, mayores serán las posibilidades de recuperación. Llame inmediatamente para recibir ayuda de emergencia si presenta:  Signos de un ataque cardíaco:  Dolor de pecho.  Problemas para respirar.  Pulso rápido.  Mareos  Signos de un accidente cerebrovascular:  Entumecimiento o debilidad repentina en la luis enrique, brazos o piernas, en especial de un lado del cuerpo  Confusión repentina, problemas para hablar o de comprensión  Problemas repentinos para ray en kamila o los dos ojos  Problemas repentinos para caminar, mareos o pérdida del equilibrio o la coordinación.  Dolor de ty repentino grave sin causa aparente  Repita la enseñanza con jacquie propias palabras (Teach Back): Ayudándolo a comprender   El método de enseñanza recíproca ayuda a comprender la información que se le está proporcionando. La idea es simple. Después de hablar con el personal, cuente con jacquie propias palabras lo que se le acaba de comunicar. [place] le asegura al personal que se driver cubierto todos los aspectos necesarios de forma lokesh. También ayuda a explicar ciertas cosas que podrían keena sido un poco confusas. Antes de irse a cruz casa, asegúrese de poder llevar a cabo lo siguiente:  Hablar sobre mi afección.  Decir cuáles son los cambios que hosea realizar con respecto a mi dieta.  Decir qué haré en htony de tener síntomas de un ataque cardíaco o accidente cerebrovascular.   ¿Dónde puedo obtener más información?   American Academy of Family Physicians  http://familydoctor.org/familydoctor/en/diseases-conditions/high-cholesterol.printerview.all.html   American Heart Association  http://www.heart.org/HEARTORG/Conditions/Cholesterol/PreventionTreatmentofHighCholesterol/Prevention-and-Treatment-of-High-Cholesterol_[facility]_001215_Article.jsp   National Heart Lung and Blood [place]  http://www.nhlbi.nih.gov/health/health-topics/topics/hbc/   [place] of Health ? Senior  Health  http://[facility].gov/highbloodcholesterol/whatishighbloodcholesterol/01.html   Exención de responsabilidad y uso de la información del consumidor   Esta información no constituye asesoramiento médico específico y no reemplaza la información que usted recibe de cruz proveedor de atención médica. Miya es tan solo un resumen de la información general. NO incluye la información completa acerca de afecciones, enfermedades, lesiones, pruebas, procedimientos, tratamientos, terapias, instrucciones para el bonnie o estilos de carina que apliquen en cruz thony. Debe hablar con el proveedor de atención médica para obtener información completa sobre cruz cornell y las opciones de tratamiento. No se debe utilizar esta información para decidir si acepta o no el consejo, instrucciones o recomendaciones del proveedor de atención médica. Solamente el proveedor de atención médica cuenta con los conocimientos y la capacitación para brindarle el mejor consejo.  Copyright   Copyright © 2021 [facility] y jacquie licenciantes y/o afiliados. Todos los derechos reservados.    Risks, benefits, and alternatives discussed with patient, Patient verbalized understanding of discussed plan of care. Asked patient if any further questions, answered no.    Future Appointments   Date Time Provider Department Center   9/4/2024  3:00 PM Nu Siddiqi NP [id number]  Gamaliel Siddiqi NP

## 2024-08-09 DIAGNOSIS — Z12.11 COLON CANCER SCREENING: Primary | ICD-10-CM

## 2024-08-12 RX ORDER — SOD SULF/POT CHLORIDE/MAG SULF 1.479 G
12 TABLET ORAL DAILY
Qty: 24 TABLET | Refills: 0 | Status: SHIPPED | OUTPATIENT
Start: 2024-08-12

## 2024-08-23 DIAGNOSIS — E11.9 TYPE 2 DIABETES MELLITUS WITHOUT COMPLICATION, WITHOUT LONG-TERM CURRENT USE OF INSULIN: Chronic | ICD-10-CM

## 2024-08-23 RX ORDER — SITAGLIPTIN 50 MG/1
50 TABLET, FILM COATED ORAL
Qty: 90 TABLET | Refills: 1 | Status: SHIPPED | OUTPATIENT
Start: 2024-08-23

## 2024-08-28 ENCOUNTER — TELEPHONE (OUTPATIENT)
Dept: OBSTETRICS AND GYNECOLOGY | Facility: CLINIC | Age: 59
End: 2024-08-28
Payer: MEDICAID

## 2024-08-28 NOTE — TELEPHONE ENCOUNTER
----- Message from Otis Marcos sent at 8/28/2024  8:53 AM CDT -----  Contact: Daniella([facility])  Daniella is calling in to see if patient can establish care and to schedule for a pap smear with the soonest appointment. When there is an available appointment please call Daniella back at [phone]

## 2024-09-26 DIAGNOSIS — Z12.11 COLON CANCER SCREENING: Primary | ICD-10-CM

## 2024-09-26 NOTE — PROGRESS NOTES
"[facility]  [address], [address], [address]  [facility] [address]  Phone: [phone]  Fax: [phone]    History & Physical         Provider: Dr. Rodolfo Arredondo DO    Patient Name: Shelby LEWIS (age):1965  59 y.o.           Gender: female   Phone: [phone]     Referring Physician:      Vital Signs:   Height - 5'3"  Weight - 135 lbs  BMI -  24.70    Plan: Colonoscopy    Encounter Diagnosis   Name Primary?    Colon cancer screening Yes           History:      Past Medical History:   Diagnosis Date    Diabetes mellitus, type 2       Past Surgical History:   Procedure Laterality Date    APPENDECTOMY       SECTION        Medication List with Changes/Refills   Current Medications    BLOOD SUGAR DIAGNOSTIC STRP    To check BG twice daily, to use with insurance preferred meter    BLOOD-GLUCOSE METER KIT    To check BG twice daily, to use with insurance preferred meter.    JANUVIA 50 MG TAB    TAKE 1 TABLET BY MOUTH DAILY    LANCETS MISC    To check BG twice daily, to use with insurance preferred meter    METFORMIN (GLUCOPHAGE) 500 MG TABLET    Take 1 tablet (500 mg total) by mouth 2 (two) times daily with meals.    ROSUVASTATIN (CRESTOR) 5 MG TABLET    Take 1 tablet (5 mg total) by mouth once daily.    SOD SULF-POT CHLORIDE-MAG SULF (SUTAB) 1.479-0.188- 0.225 GRAM TABLET    Take 12 tablets by mouth once daily. Take according to package instructions with indicated amount of water.      Review of patient's allergies indicates:  No Known Allergies   Family History   Problem Relation Name Age of Onset    Diabetes Mother        Social History     Tobacco Use    Smoking status: Never    Smokeless tobacco: Never   Substance Use Topics    Alcohol use: Not Currently    Drug use: Never        Physical Examination:     General Appearance:___________________________  HEENT: " _____________________________________  Abdomen:____________________________________  Heart:________________________________________  Lungs:_______________________________________  Extremities:___________________________________  Skin:_________________________________________  Endocrine:____________________________________  Genitourinary:_________________________________  Neurological:__________________________________      Patient has been evaluated immediately prior to sedation and is medically cleared for endoscopy with IVCS as an ASA class: ______      Physician Signature: _________________________       Date: ________  Time: ________

## 2024-10-01 ENCOUNTER — TELEPHONE (OUTPATIENT)
Dept: SURGERY | Facility: CLINIC | Age: 59
End: 2024-10-01
Payer: MEDICAID

## 2024-10-01 NOTE — TELEPHONE ENCOUNTER
----- Message from Nurse Suggs sent at 9/27/2024 11:39 AM CDT -----    ----- Message -----  From: Maria M Marshall  Sent: 9/27/2024  11:37 AM CDT  To: Maria Elena Reynolds Staff     Patient is calling in regards to appointment missed please call her back at [phone]      Pt was contacted to reschedule colonoscopy stated that she would like to r/s for 10/11 stated she has the prep but would like instructions sent to [email or web address]

## 2024-10-30 ENCOUNTER — PATIENT MESSAGE (OUTPATIENT)
Dept: PRIMARY CARE CLINIC | Facility: CLINIC | Age: 59
End: 2024-10-30
Payer: MEDICAID

## 2024-12-24 ENCOUNTER — PATIENT MESSAGE (OUTPATIENT)
Dept: ADMINISTRATIVE | Facility: HOSPITAL | Age: 59
End: 2024-12-24
Payer: MEDICAID

## 2025-01-28 ENCOUNTER — OFFICE VISIT (OUTPATIENT)
Dept: PRIMARY CARE CLINIC | Facility: CLINIC | Age: 60
End: 2025-01-28
Payer: MEDICAID

## 2025-01-28 VITALS
DIASTOLIC BLOOD PRESSURE: 78 MMHG | WEIGHT: 131.13 LBS | OXYGEN SATURATION: 99 % | RESPIRATION RATE: 18 BRPM | BODY MASS INDEX: 23.23 KG/M2 | SYSTOLIC BLOOD PRESSURE: 132 MMHG | HEART RATE: 79 BPM | HEIGHT: 63 IN

## 2025-01-28 DIAGNOSIS — E11.9 TYPE 2 DIABETES MELLITUS WITHOUT COMPLICATION, WITHOUT LONG-TERM CURRENT USE OF INSULIN: Primary | ICD-10-CM

## 2025-01-28 DIAGNOSIS — Z12.11 SCREENING FOR COLORECTAL CANCER: ICD-10-CM

## 2025-01-28 DIAGNOSIS — E78.2 MIXED HYPERLIPIDEMIA: ICD-10-CM

## 2025-01-28 DIAGNOSIS — Z12.12 SCREENING FOR COLORECTAL CANCER: ICD-10-CM

## 2025-01-28 PROCEDURE — 3078F DIAST BP <80 MM HG: CPT | Mod: CPTII,,, | Performed by: NURSE PRACTITIONER

## 2025-01-28 PROCEDURE — 3046F HEMOGLOBIN A1C LEVEL >9.0%: CPT | Mod: CPTII,,, | Performed by: NURSE PRACTITIONER

## 2025-01-28 PROCEDURE — 99214 OFFICE O/P EST MOD 30 MIN: CPT | Mod: S$PBB,,, | Performed by: NURSE PRACTITIONER

## 2025-01-28 PROCEDURE — 3075F SYST BP GE 130 - 139MM HG: CPT | Mod: CPTII,,, | Performed by: NURSE PRACTITIONER

## 2025-01-28 PROCEDURE — 3008F BODY MASS INDEX DOCD: CPT | Mod: CPTII,,, | Performed by: NURSE PRACTITIONER

## 2025-01-28 PROCEDURE — 1160F RVW MEDS BY RX/DR IN RCRD: CPT | Mod: CPTII,,, | Performed by: NURSE PRACTITIONER

## 2025-01-28 PROCEDURE — 3061F NEG MICROALBUMINURIA REV: CPT | Mod: CPTII,,, | Performed by: NURSE PRACTITIONER

## 2025-01-28 PROCEDURE — 3066F NEPHROPATHY DOC TX: CPT | Mod: CPTII,,, | Performed by: NURSE PRACTITIONER

## 2025-01-28 PROCEDURE — 1159F MED LIST DOCD IN RCRD: CPT | Mod: CPTII,,, | Performed by: NURSE PRACTITIONER

## 2025-01-28 RX ORDER — ROSUVASTATIN CALCIUM 5 MG/1
5 TABLET, COATED ORAL NIGHTLY
Qty: 30 TABLET | Refills: 5 | Status: SHIPPED | OUTPATIENT
Start: 2025-01-28

## 2025-01-28 RX ORDER — LANCETS
EACH MISCELLANEOUS
Qty: 200 EACH | Refills: 2 | Status: SHIPPED | OUTPATIENT
Start: 2025-01-28

## 2025-01-28 RX ORDER — INSULIN PUMP SYRINGE, 3 ML
EACH MISCELLANEOUS
Qty: 1 EACH | Refills: 0 | Status: SHIPPED | OUTPATIENT
Start: 2025-01-28 | End: 2026-01-26

## 2025-01-28 RX ORDER — METFORMIN HYDROCHLORIDE 500 MG/1
500 TABLET ORAL 2 TIMES DAILY WITH MEALS
Qty: 60 TABLET | Refills: 5 | Status: SHIPPED | OUTPATIENT
Start: 2025-01-28

## 2025-01-28 NOTE — PATIENT INSTRUCTIONS
"RTC in 2 months for F/U or sooner if needed.    Patient Education       Diabetes and Diet   The Basics   Written by the doctors and editors at [facility]   Why is diet important in diabetes? -- Diet is important because it is part of diabetes treatment. Many people need to change what they eat and how much they eat to help treat their diabetes. It is important for people to treat their diabetes so that they:  Keep their blood sugar at or near a normal level  Prevent long-term problems, such as heart or kidney problems, that can happen in people with diabetes  Changing your diet can also help treat obesity, high blood pressure, and high cholesterol. These conditions can affect people with diabetes and can lead to future problems, such as heart attacks or strokes.  Who will work with me to change my diet? -- Your doctor or nurse will work with you to make a food plan to change your diet. They might also recommend that you work with a "dietitian." A dietitian is an expert on food and eating.  Do I need to eat at the same times every day? -- When and how often you should eat depends, in part, on the diabetes medicines you take. For example:  People who take about the same amount of insulin at the same time each day (called a "fixed regimen") should eat meals at the same times. This is also true for people who take pills that increase insulin levels, such as sulfonylureas. Eating meals at the same time every day helps prevent low blood sugar.  People who adjust the dose and timing of their insulin each day (called a "flexible regimen") do not always have to eat meals at the same time. That's because they can time their insulin dose for before they plan to eat, and also adjust the dose for how much they plan to eat.  People who take medicines that don't usually cause low blood sugar, such as metformin, don't have to eat meals at the same time every day.  What do I need to think about when planning what to eat? -- Our bodies " "break down the food we eat into small pieces called carbohydrates, proteins, and fats.  When planning what to eat, people with diabetes need to think about:  Carbohydrates (or "carbs") - Carbohydrates, which are sugars that our bodies use for energy, can raise a person's blood sugar level. Your doctor, nurse, or dietitian will tell you how many carbohydrates you should eat at each meal or snack. Foods that have carbohydrates include:  Bread, pasta, and rice  Vegetables and fruits  Dairy foods  Foods and drinks with added sugar  It is best to get your carbohydrates from fruits, vegetables, whole grains, and low-fat milk. It is best to avoid drinks with added sugar, like soda, juices, and sports drinks.   Protein - Your doctor, nurse, or dietitian will tell you how much protein you should eat each day. It is best to eat lean meats, fish, eggs, beans, peas, soy products, nuts, and seeds.  Fats - The type of fat you eat is more important than the amount of fat. "Saturated" and "trans" fats can increase your risk for heart problems, like a heart attack.  Foods that have saturated fats include meat, butter, cheese, and ice cream.  Foods that have trans fats include processed food with "partially hydrogenated oils" on the ingredient list. This may include fried foods, store bought cookies, muffins, pies, and cakes.  "Monounsaturated" and "polyunsaturated" fats are better for you. Foods with these types of fat include fish, avocado, olive oil, and nuts.  Calories - People need to eat a certain amount of calories each day to keep their weight the same. People who are overweight and want to lose weight need to eat fewer calories each day.  Fiber - Eating foods with a lot of fiber can help control a person's blood sugar level. Foods that have a lot of fiber include apples, green beans, peas, beans, lentils, nuts, oatmeal, and whole grains.  Salt - People who have high blood pressure should not eat foods that contain a lot of " salt (also called sodium). People with high blood pressure should also eat healthy foods, such as fruits, vegetables, and low-fat dairy foods.  Alcohol - Having more than 1 drink (for women) or 2 drinks (for men) a day can raise blood sugar levels. Also, drinks that have fruit juice or soda in them can raise blood sugar levels.  What can I do if I need to lose weight? -- If you need to lose weight, you can:  Exercise - Try to get at least 30 minutes of physical activity a day, most days of the week. Even gentle exercise, like walking, is good for your health. Some people with diabetes need to change their medicine dose before they exercise. They might also need to check their blood sugar levels before and after exercising.  Eat fewer calories - Your doctor, nurse, or dietitian can tell you how many calories you should eat each day in order to lose weight.  If you are worried about your weight, size, or shape, talk with your doctor, nurse, or dietitian. They can help you make changes to improve your health.  Can I eat the same foods as my family? -- Yes. You do not need to eat special foods if you have diabetes. You and your family can eat the same foods. Changing your diet is mostly about eating healthy foods and not eating too much.  What are the other parts of diabetes treatment? -- Besides changing your diet, the other parts of diabetes treatment are:  Exercise  Medicines  Some people with diabetes need to learn how to match their diet and exercise with their medicine dose. For example, people who use insulin might need to choose the dose of insulin they give themselves. To choose their dose, they need to think about:  What they plan to eat at the next meal  How much exercise they plan to do  What their blood sugar level is  If the diet and exercise do not match the medicine dose, a person's blood sugar level can get too low or too high. Blood sugar levels that are too low or too high can cause problems.  All  topics are updated as new evidence becomes available and our peer review process is complete.  This topic retrieved from [employer] on: Sep 21, 2021.  Topic 19974 Version 7.0  Release: 29.4.2 - C29.263  © 2021 UpToDate, Inc. and/or its affiliates. All rights reserved.  Consumer Information Use and Disclaimer   This information is not specific medical advice and does not replace information you receive from your health care provider. This is only a brief summary of general information. It does NOT include all information about conditions, illnesses, injuries, tests, procedures, treatments, therapies, discharge instructions or life-style choices that may apply to you. You must talk with your health care provider for complete information about your health and treatment options. This information should not be used to decide whether or not to accept your health care provider's advice, instructions or recommendations. Only your health care provider has the knowledge and training to provide advice that is right for you. The use of this information is governed by the [employer] End User License Agreement, available at https://www.[employer]/en/solutions/[employer]/about/hank.The use of [employer] content is governed by the [employer] Terms of Use. ©2021 UpToDate, Inc. All rights reserved.  Copyright   © 2021 UpToDate, Inc. and/or its affiliates. All rights reserved.    Patient Education       Low Cholesterol, Saturated Fat, and Trans Fat Diet   About this topic   Cholesterol, saturated fat, and trans fat are in many foods. These may raise your blood cholesterol levels. If your cholesterol is too high, this can cause health problems in your heart, liver, kidneys, and even your eyes. The key to lowering your risk of heart problems is to lower your bad fat intake.  Saturated fats and trans fats are the bad fats. These fats clog your arteries and raise your bad cholesterol. Saturated fats and trans fats are solid fats at room  "temperature. Saturated fats are animal fats. Trans fats are manmade fats. They add flavor to a lot of packaged foods. Staying away from saturated and trans fats will help your heart.  When you do eat foods with fat, make sure they have the good fats. Monounsaturated and polyunsaturated fats are good fats. These fats help raise your good cholesterol and protect your heart.  General   How to Lower Fat and Cholesterol in Your Diet   Read the labels of the foods you buy from the market to find out how much fat is present. Under 5% of total fat on a label means it is "low fat". Over 20% of total fat on a label means it is high fat.  Eat high fiber foods, like soluble fiber. This type of fiber helps lower cholesterol in the body. Choose oatmeal, fruits (like apples), beans, and nuts to get the most soluble fiber.  Eat foods high in omega-3 fatty acids like hernan seeds, walnuts, salmon, tuna, trout, herring, flaxseed, and soybeans. These foods help keep the heart healthy.  Limit your bad fat and oil intake.  Stay away from butter, stick margarine, shortening, lard, and palm and coconut oil. Pick plant-based spreads instead.  Limit mayonnaise, salad dressings, gravies, and sauces, unless it is made from low-fat ingredients.  Limit chocolate.  Do not eat high-fat processed foods like hot dogs, kelley, sausage, ham and other luncheon meats high in fat, and some frozen foods. Pick fish, chicken, turkey, and lean meats instead.  Eat more dried beans, lentils, and tofu to get your protein.  Do not eat organ meats, like liver.  Choose nonfat or low-fat milk, yogurt, and cheese.  Use light or fat-free cream cheese and sour cream.  Eat lots of fruits and vegetables.  Pick whole grain breads, cereals, pastas, and rice.  Do not eat snacks that are high in fats like granola, cookies, pies, pastries, doughnuts, and croissants.  Stay away from deep fried foods.  Help When Cooking   Remove the fat portion of meats and the skin from " poultry before cooking.  Bake, broil, grill, poach, or roast poultry, fish, and lean meats.  Drain and throw away the fat that drains out of meat as you cook it.  Try to add little or no fat to foods.  Use olive or canola oil for cooking or baking.  Steam your vegetables.  Use herbs or no-oil marinades to flavor foods.         Who should use this diet?   This diet is for people who are at high risk of getting health problems like heart disease, high blood pressure, diabetes, and others. This diet is also good for all people to follow to keep your heart healthy.  What foods are good to eat?   Foods with good fats are:  Canola, peanut, and olive oil  Safflower, soybean, and corn oil  Walnuts, almonds, cashews, and peanuts  Pumpkin and sunflower seeds  [place] and tuna  Tofu  Soymilk  Avocado  What foods should be limited or avoided?   Stay away from these types of foods that have saturated fats:  Whole fat dairy products like cheese, ice cream, whole milk, and cream  Palm and coconut oils  High-fat meats like beef, lamb, poultry with the skin, kelley, and sausage  Butter and lard  Stay away from these types of foods that may have trans fat:  Cookies, cakes, candy, doughnuts, baked goods, muffins, pizza dough, and pie crusts that are packaged  Fried foods  Frozen dinners  Chips and crackers  Microwave popcorn  Stick margarine and vegetable shortenings  Helpful tips   To help stay away from saturated fat:  Pick lean cuts of meat  Take the skin off chicken and turkey or pick skinless  Pick low-fat cheese, milk, and ice cream  Use liquid oils when cooking and baking, such as olive oil and canola oil  To help stay away from trans fat:  Look at your labels. Choose foods with 0% trans fat. Read the ingredient list. Avoid foods with partially hydrogenated oil in the ingredient list. This means there is trans fat in the product.  Where can I learn more?   American Heart Association    http://www.heart.org/HEARTORG/Conditions/Cholesterol/PreventionTreatmentofHighCholesterol/Know-Your-Fats_[facility]_305628_Article.jsp   Last Reviewed Date   2021-10-05  Consumer Information Use and Disclaimer   This information is not specific medical advice and does not replace information you receive from your health care provider. This is only a brief summary of general information. It does NOT include all information about conditions, illnesses, injuries, tests, procedures, treatments, therapies, discharge instructions or life-style choices that may apply to you. You must talk with your health care provider for complete information about your health and treatment options. This information should not be used to decide whether or not to accept your health care providers advice, instructions or recommendations. Only your health care provider has the knowledge and training to provide advice that is right for you.   Copyright   Copyright © 2021 UpToDate, Inc. and its affiliates and/or licensors. All rights reserved.

## 2025-01-28 NOTE — PROGRESS NOTES
Subjective:       Patient ID: Shelby Walls is a 59 y.o. female.    Chief Complaint: Diabetes (Needs new glucometer, 375 ) and Weight Loss    HPI: Shelby is a 59 y.o. female who presents for F/U.    Mrs. Walls feels well today with no new issues or concerns.    Mrs. Walls denies feeling fatigued and she denies polyuria, polydipsia and polyphagia. Also with feet pain and numbness.    DM2 - chronic, poorly controlled. A1C is 13.7 when checked today during visit from 14.8 at her last visit in . She was started back on metformin along with the Januvia at her last visit. Also instructed to eat a diabetic diet. Mrs. Walls denies feeling fatigued and she denies polyuria, polydipsia and polyphagia. Also with feet pain and numbness.    HLD - has been controlled with crestor. Last LDL was 85 on her last labs last year. She is adherent to a low cholesterol diet but does not exercise regularly.    She has not had a PAP smear in several years and she refuses to see a GYN provider right now.    She is also due for mammogram and colonoscopy. Mammogram and colonoscopy previously ordered but she missed both appts. Will order both again today.     Denies smoking or drinking.              Past Medical History:   Diagnosis Date    Diabetes mellitus, type 2        Past Surgical History:   Procedure Laterality Date    APPENDECTOMY       SECTION         Family History   Problem Relation Name Age of Onset    Diabetes Mother         Social History     Tobacco Use    Smoking status: Never    Smokeless tobacco: Never   Substance Use Topics    Alcohol use: Not Currently    Drug use: Never       Patient Active Problem List   Diagnosis    Type 2 diabetes mellitus without complication, without long-term current use of insulin    Genital herpes simplex    Mixed hyperlipidemia       Immunization History   Administered Date(s) Administered    COVID-19, MRNA, LN-S, PF (Pfizer) (Purple Cap) 2021, 2021    Influenza  "- Trivalent - Afluria, Fluzone MDV 10/25/2007    MMR 04/27/2011    Td (ADULT) 01/12/2006    Tdap 01/12/2006, 04/27/2011    Varicella 04/27/2011           Review of Systems   Constitutional:  Negative for activity change, appetite change, chills, diaphoresis, fatigue and fever.   HENT:  Negative for congestion, ear pain, sinus pain, tinnitus and trouble swallowing.    Eyes:  Negative for visual disturbance.   Respiratory:  Negative for cough, chest tightness, shortness of breath and wheezing.    Cardiovascular:  Negative for chest pain, palpitations and leg swelling.   Gastrointestinal:  Negative for abdominal distention, abdominal pain, blood in stool, constipation, diarrhea and nausea.   Endocrine: Negative for polydipsia, polyphagia and polyuria.   Genitourinary:  Negative for dysuria, frequency and hematuria.   Musculoskeletal:  Negative for back pain, gait problem and neck pain.   Skin:  Negative for color change and rash.   Neurological:  Negative for dizziness, syncope, weakness, light-headedness, numbness and headaches.   Psychiatric/Behavioral:  Negative for behavioral problems and confusion.      Objective:     Vitals:    01/28/25 1134   BP: 132/78   BP Location: Left arm   Patient Position: Sitting   Pulse: 79   Resp: 18   SpO2: 99%   Weight: 59.5 kg (131 lb 1.6 oz)   Height: 5' 3" (1.6 m)       Physical Exam  Vitals and nursing note reviewed.   Constitutional:       General: She is not in acute distress.     Appearance: Normal appearance. She is not diaphoretic.   HENT:      Head: Normocephalic and atraumatic.   Eyes:      General:         Right eye: No discharge.         Left eye: No discharge.      Pupils: Pupils are equal, round, and reactive to light.   Cardiovascular:      Rate and Rhythm: Normal rate and regular rhythm.      Heart sounds: Normal heart sounds.   Pulmonary:      Effort: Pulmonary effort is normal.      Breath sounds: Normal breath sounds. No stridor. No wheezing or rales.   Abdominal: "      Palpations: Abdomen is soft.      Tenderness: There is no abdominal tenderness. There is no guarding.   Musculoskeletal:         General: No tenderness. Normal range of motion.      Cervical back: Normal range of motion.      Right lower leg: No edema.      Left lower leg: No edema.   Lymphadenopathy:      Cervical: No cervical adenopathy.   Skin:     General: Skin is warm and dry.      Findings: No rash.   Neurological:      General: No focal deficit present.      Mental Status: She is alert and oriented to person, place, and time.   Psychiatric:         Mood and Affect: Mood normal.         Behavior: Behavior normal.         No visits with results within 6 Month(s) from this visit.   Latest known visit with results is:   Office Visit on 06/25/2024   Component Date Value Ref Range Status    Hemoglobin A1C, POC 06/25/2024 14.8  % Final         Assessment:      1. Type 2 diabetes mellitus without complication, without long-term current use of insulin    2. Mixed hyperlipidemia    3. Screening for colorectal cancer          Plan:     Type 2 diabetes mellitus without complication, without long-term current use of insulin  -     Hemoglobin A1C; Future; Expected date: 01/28/2025  -     SITagliptin phosphate (JANUVIA) 50 MG Tab; Take 1 tablet (50 mg total) by mouth once daily.  Dispense: 30 tablet; Refill: 5  -     metFORMIN (GLUCOPHAGE) 500 MG tablet; Take 1 tablet (500 mg total) by mouth 2 (two) times daily with meals.  Dispense: 60 tablet; Refill: 5  -     blood-glucose meter kit; To check BG twice daily, to use with insurance preferred meter.  Dispense: 1 each; Refill: 0  -     blood sugar diagnostic Strp; To check BG twice daily, to use with insurance preferred meter  Dispense: 200 strip; Refill: 2  -     lancets Misc; To check BG twice daily, to use with insurance preferred meter  Dispense: 200 each; Refill: 2    Mixed hyperlipidemia  -     rosuvastatin (CRESTOR) 5 MG tablet; Take 1 tablet (5 mg total) by mouth  every evening.  Dispense: 30 tablet; Refill: 5    Screening for colorectal cancer  -     Ambulatory referral/consult to Gastroenterology; Future; Expected date: 02/04/2025         Current Outpatient Medications   Medication Sig Dispense Refill    blood sugar diagnostic Strp To check BG twice daily, to use with insurance preferred meter 200 strip 2    blood-glucose meter kit To check BG twice daily, to use with insurance preferred meter. 1 each 0    lancets Misc To check BG twice daily, to use with insurance preferred meter 200 each 2    metFORMIN (GLUCOPHAGE) 500 MG tablet Take 1 tablet (500 mg total) by mouth 2 (two) times daily with meals. 60 tablet 5    rosuvastatin (CRESTOR) 5 MG tablet Take 1 tablet (5 mg total) by mouth every evening. 30 tablet 5    SITagliptin phosphate (JANUVIA) 50 MG Tab Take 1 tablet (50 mg total) by mouth once daily. 30 tablet 5     No current facility-administered medications for this visit.       Medications Discontinued During This Encounter   Medication Reason    blood-glucose meter kit Reorder    lancets Misc Reorder    blood sugar diagnostic Strp Reorder    metFORMIN (GLUCOPHAGE) 500 MG tablet Reorder    rosuvastatin (CRESTOR) 5 MG tablet Reorder    JANUVIA 50 mg Tab Reorder    sod sulf-pot chloride-mag sulf (SUTAB) 1.479-0.188- 0.225 gram tablet Patient no longer taking       Health Maintenance   Topic Date Due    Cervical Cancer Screening  Never done    Diabetic Eye Exam  Never done    Mammogram  Never done    Pneumococcal Vaccines (Age 50+) (1 of 2 - PCV) Never done    Colorectal Cancer Screening  Never done    Shingles Vaccine (1 of 2) Never done    TETANUS VACCINE  04/27/2021    Foot Exam  10/25/2023    Influenza Vaccine (1) 09/01/2024    COVID-19 Vaccine (3 - 2024-25 season) 09/01/2024    Hemoglobin A1c  04/29/2025    Low Dose Statin  01/28/2026    Diabetes Urine Screening  01/29/2026    Lipid Panel  01/29/2026    RSV Vaccine (Age 60+ and Pregnant patients) (1 - 1-dose 75+  "series) 08/28/2040    Hepatitis C Screening  Completed    HIV Screening  Completed       Patient Instructions   RTC in 2 months for F/U or sooner if needed.    Patient Education       Diabetes and Diet   The Basics   Written by the doctors and editors at [facility]   Why is diet important in diabetes? -- Diet is important because it is part of diabetes treatment. Many people need to change what they eat and how much they eat to help treat their diabetes. It is important for people to treat their diabetes so that they:  Keep their blood sugar at or near a normal level  Prevent long-term problems, such as heart or kidney problems, that can happen in people with diabetes  Changing your diet can also help treat obesity, high blood pressure, and high cholesterol. These conditions can affect people with diabetes and can lead to future problems, such as heart attacks or strokes.  Who will work with me to change my diet? -- Your doctor or nurse will work with you to make a food plan to change your diet. They might also recommend that you work with a "dietitian." A dietitian is an expert on food and eating.  Do I need to eat at the same times every day? -- When and how often you should eat depends, in part, on the diabetes medicines you take. For example:  People who take about the same amount of insulin at the same time each day (called a "fixed regimen") should eat meals at the same times. This is also true for people who take pills that increase insulin levels, such as sulfonylureas. Eating meals at the same time every day helps prevent low blood sugar.  People who adjust the dose and timing of their insulin each day (called a "flexible regimen") do not always have to eat meals at the same time. That's because they can time their insulin dose for before they plan to eat, and also adjust the dose for how much they plan to eat.  People who take medicines that don't usually cause low blood sugar, such as metformin, don't have " "to eat meals at the same time every day.  What do I need to think about when planning what to eat? -- Our bodies break down the food we eat into small pieces called carbohydrates, proteins, and fats.  When planning what to eat, people with diabetes need to think about:  Carbohydrates (or "carbs") - Carbohydrates, which are sugars that our bodies use for energy, can raise a person's blood sugar level. Your doctor, nurse, or dietitian will tell you how many carbohydrates you should eat at each meal or snack. Foods that have carbohydrates include:  Bread, pasta, and rice  Vegetables and fruits  Dairy foods  Foods and drinks with added sugar  It is best to get your carbohydrates from fruits, vegetables, whole grains, and low-fat milk. It is best to avoid drinks with added sugar, like soda, juices, and sports drinks.   Protein - Your doctor, nurse, or dietitian will tell you how much protein you should eat each day. It is best to eat lean meats, fish, eggs, beans, peas, soy products, nuts, and seeds.  Fats - The type of fat you eat is more important than the amount of fat. "Saturated" and "trans" fats can increase your risk for heart problems, like a heart attack.  Foods that have saturated fats include meat, butter, cheese, and ice cream.  Foods that have trans fats include processed food with "partially hydrogenated oils" on the ingredient list. This may include fried foods, store bought cookies, muffins, pies, and cakes.  "Monounsaturated" and "polyunsaturated" fats are better for you. Foods with these types of fat include fish, avocado, olive oil, and nuts.  Calories - People need to eat a certain amount of calories each day to keep their weight the same. People who are overweight and want to lose weight need to eat fewer calories each day.  Fiber - Eating foods with a lot of fiber can help control a person's blood sugar level. Foods that have a lot of fiber include apples, green beans, peas, beans, lentils, nuts, " oatmeal, and whole grains.  Salt - People who have high blood pressure should not eat foods that contain a lot of salt (also called sodium). People with high blood pressure should also eat healthy foods, such as fruits, vegetables, and low-fat dairy foods.  Alcohol - Having more than 1 drink (for women) or 2 drinks (for men) a day can raise blood sugar levels. Also, drinks that have fruit juice or soda in them can raise blood sugar levels.  What can I do if I need to lose weight? -- If you need to lose weight, you can:  Exercise - Try to get at least 30 minutes of physical activity a day, most days of the week. Even gentle exercise, like walking, is good for your health. Some people with diabetes need to change their medicine dose before they exercise. They might also need to check their blood sugar levels before and after exercising.  Eat fewer calories - Your doctor, nurse, or dietitian can tell you how many calories you should eat each day in order to lose weight.  If you are worried about your weight, size, or shape, talk with your doctor, nurse, or dietitian. They can help you make changes to improve your health.  Can I eat the same foods as my family? -- Yes. You do not need to eat special foods if you have diabetes. You and your family can eat the same foods. Changing your diet is mostly about eating healthy foods and not eating too much.  What are the other parts of diabetes treatment? -- Besides changing your diet, the other parts of diabetes treatment are:  Exercise  Medicines  Some people with diabetes need to learn how to match their diet and exercise with their medicine dose. For example, people who use insulin might need to choose the dose of insulin they give themselves. To choose their dose, they need to think about:  What they plan to eat at the next meal  How much exercise they plan to do  What their blood sugar level is  If the diet and exercise do not match the medicine dose, a person's blood  sugar level can get too low or too high. Blood sugar levels that are too low or too high can cause problems.  All topics are updated as new evidence becomes available and our peer review process is complete.  This topic retrieved from BPT on: Sep 21, 2021.  Topic 50810 Version 7.0  Release: 29.4.2 - C29.263  © 2021 UpToDate, Inc. and/or its affiliates. All rights reserved.  Consumer Information Use and Disclaimer   This information is not specific medical advice and does not replace information you receive from your health care provider. This is only a brief summary of general information. It does NOT include all information about conditions, illnesses, injuries, tests, procedures, treatments, therapies, discharge instructions or life-style choices that may apply to you. You must talk with your health care provider for complete information about your health and treatment options. This information should not be used to decide whether or not to accept your health care provider's advice, instructions or recommendations. Only your health care provider has the knowledge and training to provide advice that is right for you. The use of this information is governed by the [employer] End User License Agreement, available at https://www.[employer]/en/solutions/[employer]/about/hank.The use of BPT content is governed by the BPT Terms of Use. ©2021 UpToDate, Inc. All rights reserved.  Copyright   © 2021 UpToDate, Inc. and/or its affiliates. All rights reserved.    Patient Education       Low Cholesterol, Saturated Fat, and Trans Fat Diet   About this topic   Cholesterol, saturated fat, and trans fat are in many foods. These may raise your blood cholesterol levels. If your cholesterol is too high, this can cause health problems in your heart, liver, kidneys, and even your eyes. The key to lowering your risk of heart problems is to lower your bad fat intake.  Saturated fats and trans fats are the bad fats. These  "fats clog your arteries and raise your bad cholesterol. Saturated fats and trans fats are solid fats at room temperature. Saturated fats are animal fats. Trans fats are manmade fats. They add flavor to a lot of packaged foods. Staying away from saturated and trans fats will help your heart.  When you do eat foods with fat, make sure they have the good fats. Monounsaturated and polyunsaturated fats are good fats. These fats help raise your good cholesterol and protect your heart.  General   How to Lower Fat and Cholesterol in Your Diet   Read the labels of the foods you buy from the market to find out how much fat is present. Under 5% of total fat on a label means it is "low fat". Over 20% of total fat on a label means it is high fat.  Eat high fiber foods, like soluble fiber. This type of fiber helps lower cholesterol in the body. Choose oatmeal, fruits (like apples), beans, and nuts to get the most soluble fiber.  Eat foods high in omega-3 fatty acids like hernan seeds, walnuts, salmon, tuna, trout, herring, flaxseed, and soybeans. These foods help keep the heart healthy.  Limit your bad fat and oil intake.  Stay away from butter, stick margarine, shortening, lard, and palm and coconut oil. Pick plant-based spreads instead.  Limit mayonnaise, salad dressings, gravies, and sauces, unless it is made from low-fat ingredients.  Limit chocolate.  Do not eat high-fat processed foods like hot dogs, kelley, sausage, ham and other luncheon meats high in fat, and some frozen foods. Pick fish, chicken, turkey, and lean meats instead.  Eat more dried beans, lentils, and tofu to get your protein.  Do not eat organ meats, like liver.  Choose nonfat or low-fat milk, yogurt, and cheese.  Use light or fat-free cream cheese and sour cream.  Eat lots of fruits and vegetables.  Pick whole grain breads, cereals, pastas, and rice.  Do not eat snacks that are high in fats like granola, cookies, pies, pastries, doughnuts, and " croissants.  Stay away from deep fried foods.  Help When Cooking   Remove the fat portion of meats and the skin from poultry before cooking.  Bake, broil, grill, poach, or roast poultry, fish, and lean meats.  Drain and throw away the fat that drains out of meat as you cook it.  Try to add little or no fat to foods.  Use olive or canola oil for cooking or baking.  Steam your vegetables.  Use herbs or no-oil marinades to flavor foods.         Who should use this diet?   This diet is for people who are at high risk of getting health problems like heart disease, high blood pressure, diabetes, and others. This diet is also good for all people to follow to keep your heart healthy.  What foods are good to eat?   Foods with good fats are:  Canola, peanut, and olive oil  Safflower, soybean, and corn oil  Walnuts, almonds, cashews, and peanuts  Pumpkin and sunflower seeds  [place] and tuna  Tofu  Soymilk  Avocado  What foods should be limited or avoided?   Stay away from these types of foods that have saturated fats:  Whole fat dairy products like cheese, ice cream, whole milk, and cream  Palm and coconut oils  High-fat meats like beef, lamb, poultry with the skin, kelley, and sausage  Butter and lard  Stay away from these types of foods that may have trans fat:  Cookies, cakes, candy, doughnuts, baked goods, muffins, pizza dough, and pie crusts that are packaged  Fried foods  Frozen dinners  Chips and crackers  Microwave popcorn  Stick margarine and vegetable shortenings  Helpful tips   To help stay away from saturated fat:  Pick lean cuts of meat  Take the skin off chicken and turkey or pick skinless  Pick low-fat cheese, milk, and ice cream  Use liquid oils when cooking and baking, such as olive oil and canola oil  To help stay away from trans fat:  Look at your labels. Choose foods with 0% trans fat. Read the ingredient list. Avoid foods with partially hydrogenated oil in the ingredient list. This means there is trans fat  in the product.  Where can I learn more?   American Heart Association   http://www.heart.org/HEARTORG/Conditions/Cholesterol/PreventionTreatmentofHighCholesterol/Know-Your-Fats_[facility]_305628_Article.jsp   Last Reviewed Date   2021-10-05  Consumer Information Use and Disclaimer   This information is not specific medical advice and does not replace information you receive from your health care provider. This is only a brief summary of general information. It does NOT include all information about conditions, illnesses, injuries, tests, procedures, treatments, therapies, discharge instructions or life-style choices that may apply to you. You must talk with your health care provider for complete information about your health and treatment options. This information should not be used to decide whether or not to accept your health care providers advice, instructions or recommendations. Only your health care provider has the knowledge and training to provide advice that is right for you.   Copyright   Copyright © 2021 UpToDate, Inc. and its affiliates and/or licensors. All rights reserved.        Risks, benefits, and alternatives discussed with patient, Patient verbalized understanding of discussed plan of care. Asked patient if any further questions, answered no.    Future Appointments   Date Time Provider Department Center   3/28/2025 10:40 AM Allyson Siddiqi NP [facility]              Allyson Siddiqi NP

## 2025-01-29 ENCOUNTER — CLINICAL SUPPORT (OUTPATIENT)
Dept: OBSTETRICS AND GYNECOLOGY | Facility: CLINIC | Age: 60
End: 2025-01-29
Payer: MEDICAID

## 2025-01-29 DIAGNOSIS — Z01.89 ROUTINE LAB DRAW: Primary | ICD-10-CM

## 2025-01-29 LAB
25(OH)D3 SERPL-MCNC: 33 NG/ML
ALBUMIN SERPL BCP-MCNC: 3.8 G/DL (ref 3.4–5)
ALBUMIN/GLOBULIN RATIO: 1.1 RATIO (ref 1.1–1.8)
ALP SERPL-CCNC: 120 U/L (ref 45–117)
ALT SERPL W P-5'-P-CCNC: 16 U/L (ref 13–56)
ANION GAP SERPL CALC-SCNC: 10 MMOL/L (ref 3–11)
APPEARANCE, UA: CLEAR
AST SERPL-CCNC: 14 U/L (ref 15–37)
BASOPHILS NFR BLD: 0.5 % (ref 0–3)
BILIRUB SERPL-MCNC: 0.3 MG/DL (ref 0.2–1)
BILIRUB UR QL STRIP: NEGATIVE MG/DL
BUN SERPL-MCNC: 12 MG/DL (ref 7–18)
BUN/CREAT SERPL: 25 RATIO
CALCIUM SERPL-MCNC: 9 MG/DL (ref 8.5–10.1)
CHLORIDE SERPL-SCNC: 104 MMOL/L (ref 98–107)
CHOLEST SERPL-MSCNC: 260 MG/DL
CO2 SERPL-SCNC: 23 MMOL/L (ref 21–32)
COLOR UR: NORMAL
CREAT SERPL-MCNC: 0.48 MG/DL (ref 0.55–1.02)
CREATININE, URINE: 48.9 MG/DL (ref 10–175)
EOSINOPHIL NFR BLD: 0.8 % (ref 0–6)
ERYTHROCYTE [DISTWIDTH] IN BLOOD BY AUTOMATED COUNT: 13.2 % (ref 0–15.5)
GFR ESTIMATION: 109
GLOBULIN: 3.4 G/DL (ref 2.3–3.5)
GLUCOSE (UA): >1000 MG/DL
GLUCOSE SERPL-MCNC: 282 MG/DL (ref 74–106)
HBA1C MFR BLD: 12.7 % (ref 4.2–6.3)
HCT VFR BLD AUTO: 39.3 % (ref 37–47)
HDL/CHOLESTEROL RATIO: 6.5 RATIO
HDLC SERPL-MCNC: 40 MG/DL
HGB BLD-MCNC: 12.8 G/DL (ref 12–16)
HGB UR QL STRIP: NEGATIVE /UL
KETONES UR QL STRIP: NEGATIVE MG/DL
LDLC SERPL CALC-MCNC: 160.8 MG/DL
LEUKOCYTE ESTERASE UR QL STRIP: NEGATIVE /UL
LYMPHOCYTES NFR BLD: 29.2 % (ref 20–45)
MCH RBC QN AUTO: 28.1 PG (ref 27–32)
MCHC RBC AUTO-ENTMCNC: 32.6 % (ref 32–36)
MCV RBC AUTO: 86.2 FL (ref 80–99)
MICROALBUMIN URINE: 12.9 MG/L (ref 0–20)
MICROALBUMIN/CREATININE RATIO: 26 MG/G (ref 0–30)
MONOCYTES NFR BLD: 5.5 % (ref 2–10)
NEUTROPHILS # BLD AUTO: 3.9 10*3/UL (ref 1.4–7)
NEUTROPHILS NFR BLD: 63.8 % (ref 50–80)
NITRITE UR QL STRIP: NEGATIVE
NUCLEATED RED BLOOD CELLS: 0 % (ref 0–0.2)
PH UR STRIP: 6 PH (ref 5–8)
PLATELETS: 264 10*3/UL (ref 130–400)
POTASSIUM SERPL-SCNC: 4.2 MMOL/L (ref 3.5–5.1)
PROT SERPL-MCNC: 7.2 G/DL (ref 6.4–8.2)
PROT UR QL STRIP: NEGATIVE MG/DL
RBC # BLD AUTO: 4.56 10*6/UL (ref 4.2–5.4)
SODIUM BLD-SCNC: 137 MMOL/L (ref 131–143)
SP GR UR STRIP: 1.02 (ref 1–1.03)
T4 FREE SP9 P CHAL SERPL-SCNC: 1.19 NG/DL (ref 0.76–1.46)
TRIGL SERPL-MCNC: 296 MG/DL (ref 0–149)
TSH SERPL DL<=0.005 MIU/L-ACNC: 0.8 UIU/ML (ref 0.36–3.74)
UROBILINOGEN UR STRIP-ACNC: NORMAL MG/DL
VLDL CHOLESTEROL: 59 MG/DL
WBC # BLD: 6 10*3/UL (ref 4.5–10)

## 2025-05-09 ENCOUNTER — PATIENT MESSAGE (OUTPATIENT)
Facility: CLINIC | Age: 60
End: 2025-05-09
Payer: MEDICAID

## 2025-07-28 ENCOUNTER — PATIENT MESSAGE (OUTPATIENT)
Dept: ADMINISTRATIVE | Facility: HOSPITAL | Age: 60
End: 2025-07-28
Payer: MEDICAID

## 2025-08-14 ENCOUNTER — TELEPHONE (OUTPATIENT)
Dept: FAMILY MEDICINE | Facility: CLINIC | Age: 60
End: 2025-08-14
Payer: MEDICAID

## 2025-08-29 ENCOUNTER — PATIENT MESSAGE (OUTPATIENT)
Dept: ADMINISTRATIVE | Facility: HOSPITAL | Age: 60
End: 2025-08-29
Payer: MEDICAID